# Patient Record
Sex: MALE | Race: WHITE | Employment: OTHER | URBAN - METROPOLITAN AREA
[De-identification: names, ages, dates, MRNs, and addresses within clinical notes are randomized per-mention and may not be internally consistent; named-entity substitution may affect disease eponyms.]

---

## 2022-03-19 PROBLEM — G43.009 MIGRAINE WITHOUT AURA: Status: ACTIVE | Noted: 2017-09-22

## 2022-03-19 PROBLEM — C44.111 BASAL CELL CARCINOMA (BCC) OF EYELID: Status: ACTIVE | Noted: 2018-02-19

## 2022-09-06 ENCOUNTER — NURSE ONLY (OUTPATIENT)
Dept: UROLOGY | Age: 67
End: 2022-09-06

## 2022-09-06 DIAGNOSIS — R97.20 ELEVATED PROSTATE SPECIFIC ANTIGEN (PSA): ICD-10-CM

## 2022-09-07 LAB
PSA FREE MFR SERPL: 23.7 %
PSA FREE SERPL-MCNC: 1.4 NG/ML
PSA SERPL-MCNC: 5.9 NG/ML

## 2022-10-05 PROBLEM — R97.20 ELEVATED PSA: Status: ACTIVE | Noted: 2022-10-05

## 2022-11-08 NOTE — PERIOP NOTE
Patient verified name and . Order for consent  found in EHR and matches case posting; patient verifies procedure. Type 1a surgery, PAT phone assessment complete. Orders  received. Labs per surgeon: UA s/h per surgeon . Patient agrees to come to [place] [address], [address] for pre-surgery walk- in labs and verbalizes understanding that lab is open from 8:00 am to 3:30 pm Monday through Friday. Chart flagged for charge nurse. Labs per anesthesia protocol: none    Patient answered medical/surgical history questions at their best of ability. All prior to admission medications documented in [facility]. Patient instructed to take the following medications the day of surgery according to anesthesia guidelines with a small sip of water: antibiotic as instructed by surgeon. You may substitute for Tylenol 650 mg. Hold all vitamins 7 days prior to surgery and NSAIDS 5 days prior to surgery. Prescription meds to hold:Vitamins and supplements, NSAIDS  Patient instructed on the following:    > Arrive at [facility], time of arrival to be called the day before by 1700  > NPO after midnight, unless otherwise indicated, including gum, mints, and ice chips  > Responsible adult must drive patient to the hospital, stay during surgery, and patient will need supervision 24 hours after anesthesia  > Use antibacterial soap in shower the night before surgery and on the morning of surgery  > All piercings must be removed prior to arrival.    > Leave all valuables (money and jewelry) at home but bring insurance card and ID on DOS.   > You may be required to pay a deductible or co-pay on the day of your procedure. You can pre-pay by calling [phone] if your surgery is at the [facility] or [phone] if your surgery is at the [facility]. > Do not wear make-up, nail polish, lotions, cologne, perfumes, powders, or oil on skin. Artificial nails are not permitted.

## 2022-11-09 ENCOUNTER — ANESTHESIA EVENT (OUTPATIENT)
Dept: SURGERY | Age: 67
End: 2022-11-09
Payer: MEDICARE

## 2022-11-09 ENCOUNTER — HOSPITAL ENCOUNTER (OUTPATIENT)
Dept: LAB | Age: 67
Discharge: HOME | End: 2022-11-12
Payer: MEDICARE

## 2022-11-09 LAB
APPEARANCE UR: CLEAR
BACTERIA URNS QL MICRO: NEGATIVE /HPF
BILIRUB UR QL: NEGATIVE
CASTS URNS QL MICRO: ABNORMAL /LPF
COLOR UR: ABNORMAL
EPI CELLS #/AREA URNS HPF: ABNORMAL /HPF
GLUCOSE UR STRIP.AUTO-MCNC: 250 MG/DL
HGB UR QL STRIP: ABNORMAL
KETONES UR QL STRIP.AUTO: NEGATIVE MG/DL
LEUKOCYTE ESTERASE UR QL STRIP.AUTO: NEGATIVE
NITRITE UR QL STRIP.AUTO: NEGATIVE
PH UR STRIP: 5.5 [PH] (ref 5–9)
PROT UR STRIP-MCNC: NEGATIVE MG/DL
RBC #/AREA URNS HPF: ABNORMAL /HPF
SP GR UR REFRACTOMETRY: 1.01 (ref 1–1.02)
UROBILINOGEN UR QL STRIP.AUTO: 0.2 EU/DL (ref 0.2–1)
WBC URNS QL MICRO: ABNORMAL /HPF

## 2022-11-09 PROCEDURE — 81001 URINALYSIS AUTO W/SCOPE: CPT

## 2022-11-09 NOTE — PROGRESS NOTES
Contains abnormal data Urinalysis  Order: [id number]  Status: Final result    Visible to patient: No (not released)    Next appt: 12/02/2022 at 11:30 AM in Urology (Jennifer Richardson MD)    0 Result Notes  Component Ref Range & Units 11/9/22 0940    Color, UA   YELLOW/STRAW    Comment: Color Reference Range: Straw, Yellow or Dark Yellow   Appearance   CLEAR    Specific Gravity, UA 1.001 - 1.023   1.010    pH, Urine 5.0 - 9.0   5.5    Protein, UA NEG mg/dL Negative    Glucose, UA mg/dL 250    Ketones, Urine NEG mg/dL Negative    Bilirubin Urine NEG   Negative    Blood, Urine NEG   TRACE Abnormal     Urobilinogen, Urine 0.2 - 1.0 EU/dL 0.2    Nitrite, Urine NEG   Negative    Leukocyte Esterase, Urine NEG   Negative    WBC, UA U4 /hpf 0-4    RBC, UA U5 /hpf 0-5    Epithelial Cells UA U5 /hpf 0-5    BACTERIA, URINE NEG /hpf Negative    Casts U2 /lpf 0-2    Resulting [address]              Specimen Collected: 11/09/22 09:40 EST Last Resulted: 11/09/22 10:32 EST

## 2022-11-10 ENCOUNTER — ANESTHESIA (OUTPATIENT)
Dept: SURGERY | Age: 67
End: 2022-11-10
Payer: MEDICARE

## 2022-11-10 ENCOUNTER — HOSPITAL ENCOUNTER (OUTPATIENT)
Age: 67
Setting detail: OUTPATIENT SURGERY
Discharge: HOME | End: 2022-11-10
Attending: UROLOGY | Admitting: UROLOGY
Payer: MEDICARE

## 2022-11-10 VITALS
HEART RATE: 56 BPM | RESPIRATION RATE: 16 BRPM | OXYGEN SATURATION: 100 % | TEMPERATURE: 98 F | WEIGHT: 183 LBS | DIASTOLIC BLOOD PRESSURE: 92 MMHG | BODY MASS INDEX: 24.79 KG/M2 | SYSTOLIC BLOOD PRESSURE: 128 MMHG | HEIGHT: 72 IN

## 2022-11-10 DIAGNOSIS — R97.20 ELEVATED PSA: ICD-10-CM

## 2022-11-10 LAB
APPEARANCE UR: CLEAR
BACTERIA URNS QL MICRO: NEGATIVE /HPF
BILIRUB UR QL: NEGATIVE
CASTS URNS QL MICRO: ABNORMAL /LPF
COLOR UR: ABNORMAL
EPI CELLS #/AREA URNS HPF: ABNORMAL /HPF
GLUCOSE UR STRIP.AUTO-MCNC: NEGATIVE MG/DL
HGB UR QL STRIP: ABNORMAL
KETONES UR QL STRIP.AUTO: NEGATIVE MG/DL
LEUKOCYTE ESTERASE UR QL STRIP.AUTO: NEGATIVE
NITRITE UR QL STRIP.AUTO: NEGATIVE
PH UR STRIP: 6.5 [PH] (ref 5–9)
PROT UR STRIP-MCNC: ABNORMAL MG/DL
RBC #/AREA URNS HPF: ABNORMAL /HPF
SP GR UR REFRACTOMETRY: 1.02 (ref 1–1.02)
UROBILINOGEN UR QL STRIP.AUTO: 0.2 EU/DL (ref 0.2–1)
WBC URNS QL MICRO: ABNORMAL /HPF

## 2022-11-10 PROCEDURE — 7100000011 HC PHASE II RECOVERY - ADDTL 15 MIN: Performed by: UROLOGY

## 2022-11-10 PROCEDURE — 6360000002 HC RX W HCPCS

## 2022-11-10 PROCEDURE — 3700000000 HC ANESTHESIA ATTENDED CARE: Performed by: UROLOGY

## 2022-11-10 PROCEDURE — 2580000003 HC RX 258

## 2022-11-10 PROCEDURE — 3600000012 HC SURGERY LEVEL 2 ADDTL 15MIN: Performed by: UROLOGY

## 2022-11-10 PROCEDURE — 7100000001 HC PACU RECOVERY - ADDTL 15 MIN: Performed by: UROLOGY

## 2022-11-10 PROCEDURE — 2709999900 HC NON-CHARGEABLE SUPPLY: Performed by: UROLOGY

## 2022-11-10 PROCEDURE — 3600000002 HC SURGERY LEVEL 2 BASE: Performed by: UROLOGY

## 2022-11-10 PROCEDURE — 2580000003 HC RX 258: Performed by: ANESTHESIOLOGY

## 2022-11-10 PROCEDURE — 2580000003 HC RX 258: Performed by: UROLOGY

## 2022-11-10 PROCEDURE — 3700000001 HC ADD 15 MINUTES (ANESTHESIA): Performed by: UROLOGY

## 2022-11-10 PROCEDURE — 88305 TISSUE EXAM BY PATHOLOGIST: CPT

## 2022-11-10 PROCEDURE — 55700 PR BIOPSY OF PROSTATE,NEEDLE/PUNCH: CPT | Performed by: UROLOGY

## 2022-11-10 PROCEDURE — 6360000002 HC RX W HCPCS: Performed by: UROLOGY

## 2022-11-10 PROCEDURE — 2500000003 HC RX 250 WO HCPCS

## 2022-11-10 PROCEDURE — 81001 URINALYSIS AUTO W/SCOPE: CPT

## 2022-11-10 PROCEDURE — 76942 ECHO GUIDE FOR BIOPSY: CPT | Performed by: UROLOGY

## 2022-11-10 PROCEDURE — 7100000010 HC PHASE II RECOVERY - FIRST 15 MIN: Performed by: UROLOGY

## 2022-11-10 PROCEDURE — 7100000000 HC PACU RECOVERY - FIRST 15 MIN: Performed by: UROLOGY

## 2022-11-10 RX ORDER — SODIUM CHLORIDE 0.9 % (FLUSH) 0.9 %
5-40 SYRINGE (ML) INJECTION EVERY 12 HOURS SCHEDULED
Status: DISCONTINUED | OUTPATIENT
Start: 2022-11-10 | End: 2022-11-10 | Stop reason: HOSPADM

## 2022-11-10 RX ORDER — SODIUM CHLORIDE 0.9 % (FLUSH) 0.9 %
5-40 SYRINGE (ML) INJECTION PRN
Status: DISCONTINUED | OUTPATIENT
Start: 2022-11-10 | End: 2022-11-10 | Stop reason: HOSPADM

## 2022-11-10 RX ORDER — HYDROMORPHONE HYDROCHLORIDE 2 MG/ML
0.25 INJECTION, SOLUTION INTRAMUSCULAR; INTRAVENOUS; SUBCUTANEOUS EVERY 5 MIN PRN
Status: DISCONTINUED | OUTPATIENT
Start: 2022-11-10 | End: 2022-11-10 | Stop reason: HOSPADM

## 2022-11-10 RX ORDER — SODIUM CHLORIDE 9 MG/ML
INJECTION, SOLUTION INTRAVENOUS PRN
Status: DISCONTINUED | OUTPATIENT
Start: 2022-11-10 | End: 2022-11-10 | Stop reason: HOSPADM

## 2022-11-10 RX ORDER — SODIUM CHLORIDE, SODIUM LACTATE, POTASSIUM CHLORIDE, CALCIUM CHLORIDE 600; 310; 30; 20 MG/100ML; MG/100ML; MG/100ML; MG/100ML
INJECTION, SOLUTION INTRAVENOUS CONTINUOUS
Status: DISCONTINUED | OUTPATIENT
Start: 2022-11-10 | End: 2022-11-10 | Stop reason: HOSPADM

## 2022-11-10 RX ORDER — PROPOFOL 10 MG/ML
INJECTION, EMULSION INTRAVENOUS PRN
Status: DISCONTINUED | OUTPATIENT
Start: 2022-11-10 | End: 2022-11-10 | Stop reason: SDUPTHER

## 2022-11-10 RX ORDER — LIDOCAINE HYDROCHLORIDE 10 MG/ML
1 INJECTION, SOLUTION INFILTRATION; PERINEURAL
Status: DISCONTINUED | OUTPATIENT
Start: 2022-11-10 | End: 2022-11-10 | Stop reason: HOSPADM

## 2022-11-10 RX ORDER — LIDOCAINE HYDROCHLORIDE 20 MG/ML
INJECTION, SOLUTION EPIDURAL; INFILTRATION; INTRACAUDAL; PERINEURAL PRN
Status: DISCONTINUED | OUTPATIENT
Start: 2022-11-10 | End: 2022-11-10 | Stop reason: SDUPTHER

## 2022-11-10 RX ORDER — HALOPERIDOL 5 MG/ML
1 INJECTION INTRAMUSCULAR
Status: DISCONTINUED | OUTPATIENT
Start: 2022-11-10 | End: 2022-11-10 | Stop reason: HOSPADM

## 2022-11-10 RX ORDER — ONDANSETRON 2 MG/ML
4 INJECTION INTRAMUSCULAR; INTRAVENOUS
Status: DISCONTINUED | OUTPATIENT
Start: 2022-11-10 | End: 2022-11-10 | Stop reason: HOSPADM

## 2022-11-10 RX ORDER — OXYCODONE HYDROCHLORIDE 5 MG/1
5 TABLET ORAL
Status: DISCONTINUED | OUTPATIENT
Start: 2022-11-10 | End: 2022-11-10 | Stop reason: HOSPADM

## 2022-11-10 RX ORDER — DEXTROSE MONOHYDRATE 100 MG/ML
INJECTION, SOLUTION INTRAVENOUS CONTINUOUS PRN
Status: DISCONTINUED | OUTPATIENT
Start: 2022-11-10 | End: 2022-11-10 | Stop reason: HOSPADM

## 2022-11-10 RX ADMIN — SODIUM CHLORIDE, SODIUM LACTATE, POTASSIUM CHLORIDE, AND CALCIUM CHLORIDE: 600; 310; 30; 20 INJECTION, SOLUTION INTRAVENOUS at 08:22

## 2022-11-10 RX ADMIN — LIDOCAINE HYDROCHLORIDE 40 MG: 20 INJECTION, SOLUTION EPIDURAL; INFILTRATION; INTRACAUDAL; PERINEURAL at 10:20

## 2022-11-10 RX ADMIN — PHENYLEPHRINE HYDROCHLORIDE 100 MCG: 10 INJECTION INTRAVENOUS at 10:40

## 2022-11-10 RX ADMIN — CEFTRIAXONE 1000 MG: 1 INJECTION, POWDER, FOR SOLUTION INTRAMUSCULAR; INTRAVENOUS at 10:22

## 2022-11-10 RX ADMIN — PROPOFOL 50 MG: 10 INJECTION, EMULSION INTRAVENOUS at 10:20

## 2022-11-10 RX ADMIN — PROPOFOL 160 MCG/KG/MIN: 10 INJECTION, EMULSION INTRAVENOUS at 10:21

## 2022-11-10 ASSESSMENT — PAIN - FUNCTIONAL ASSESSMENT: PAIN_FUNCTIONAL_ASSESSMENT: 0-10

## 2022-11-10 NOTE — OP NOTE
Operative Note      Patient: Ministerio Durand  YOB: 1955  MRN: [id number]    Date of Procedure: 11/10/2022    Pre-Op Diagnosis: Elevated PSA [R97.20]    Post-Op Diagnosis: Same       Procedure(s):  PROSTATE BIOPSY/MRI FUSION    Surgeon(s):  Yuriy Link MD    Assistant:   * No surgical staff found *    Anesthesia: Monitor Anesthesia Care    Estimated Blood Loss (mL): Minimal    Complications: None    Specimens:   ID Type Source Tests Collected by Time Destination   A : LLB Tissue Prostate SURGICAL PATHOLOGY Yuriy Link, MD 11/10/2022 9357    B : LLM Tissue Prostate SURGICAL PATHOLOGY Yuriy Link, MD 11/10/2022 4882    C : LLA Tissue Prostate SURGICAL PATHOLOGY Yuriy Link, MD 11/10/2022 4503    D : LB Tissue Prostate SURGICAL PATHOLOGY Yuriy Link, MD 11/10/2022 4657    E : LM Tissue Prostate SURGICAL PATHOLOGY Yuriy Link MD 11/10/2022 1771    F : LA Tissue Prostate SURGICAL PATHOLOGY Yuriy Link, MD 11/10/2022 3521    G : RB Tissue Prostate SURGICAL PATHOLOGY Yuriy Link MD 11/10/2022 8982    H : RM Tissue Prostate SURGICAL PATHOLOGY Yuriy Link, MD 11/10/2022 0806    I : RA Tissue Prostate SURGICAL PATHOLOGY Yuriy Link MD 11/10/2022 7019    J : RLB Tissue Prostate SURGICAL PATHOLOGY Yuriy Link MD 11/10/2022 7092    K Belkis Dyer Tissue Prostate SURGICAL PATHOLOGY Yuriy Link MD 11/10/2022 3149    L : RLA Tissue Prostate SURGICAL PATHOLOGY Yuriy Link MD 11/10/2022 7154    M : ELIZABETH 1 RA Tissue Prostate SURGICAL PATHOLOGY Yuriy Link MD 11/10/2022 1549    N : ELIZABETH 2 RIGHT ANTERIOR Tissue Prostate SURGICAL PATHOLOGY Yuriy Link MD 11/10/2022 1045        Implants:  * No implants in log *      Drains: * No LDAs found *    Findings: see op note    Detailed Description of Procedure:   Operative Note                Patient: Ministerio Durand, [id number]    Date of Surgery: 11/10/22    Preoperative Diagnosis: Elevated psa and prostate lesion(s) on MRI imaging    Postoperative Diagnosis:  same    Surgeon(s) and Role:     * Haroldo Gomes MD - Primary     Anesthesia:  IV sedation     Procedure: Procedure(s):  URONAV MRI fused TRUS prostate biopsy     RISKS DISCUSSION:     Discussed the risk of surgery including infection, hematoma, bleeding, and the risks of general anesthetic. The patient understands the risks, any and all questions were answered to the patient's satisfaction and they freely signed the consent for operation. URONAV MRI FUSED TRANSRECTAL ULTRASOUND GUIDED BIOPSY OF THE PROSTATE    All risks, benefits and alternatives were again reviewed and he is willing to proceed at this time. The patient was placed in the left lateral decubitus position. I then inserted the transrectal ultrasound probe into the rectum. The prostate was visualized. The prostate appeared homogenous in appearance. Ultrasonographic sweep of the prostate was performed to obtain images to link to MRI via URONAV. There were 2 regions of interest based upon MRI imaging (right apex, right anterior). 4,3 biopsies of regions of interest were then obtained using the ultrasound images for guidance that had been linked to the previous MRI using Uronav. I then performed 12 needle core biopsies using a standard sextant biopsy format with traditional ultrasound images for guidance. The ultrasound probe was removed. The patient tolerated the procedure well.       PROSTATE VOLUME:  55 gm  PSA 5.9 PSAD 0.11    Estimated Blood Loss:  minimal    Specimens: prostate biopsies             Drains: none                 Implants: * No implants in log *           Signed By: Haroldo Gomes MD                Electronically signed by Jose Armando Lopez MD on 11/10/2022 at 10:48 AM

## 2022-11-10 NOTE — ANESTHESIA PRE PROCEDURE
Department of Anesthesiology  Preprocedure Note       Name:  Gal Lundy   Age:  79 y.o.  :  1955                                          MRN:  [id number]         Date:  11/10/2022      Surgeon: Janeen Armenta):  Armando Darby MD    Procedure: Procedure(s):  PROSTATE BIOPSY/MRI FUSION    Medications prior to admission:   Prior to Admission medications    Medication Sig Start Date End Date Taking? Authorizing Provider   Cholecalciferol 50 MCG (2000) TABS Take by mouth daily    Ar Automatic Reconciliation   pravastatin (PRAVACHOL) 20 MG tablet TAKE 1 TABLET BY MOUTH EVERY NIGHT 22   Ar Automatic Reconciliation       Current medications:    Current Facility-Administered Medications   Medication Dose Route Frequency Provider Last Rate Last Admin    lidocaine 1 % injection 1 mL  1 mL IntraDERmal Once PRN Kalen Agustin MD        lactated ringers infusion   IntraVENous Continuous Kalen Agustin  mL/hr at 11/10/22 0822 New Bag at 11/10/22 8942    sodium chloride flush 0.9 % injection 5-40 mL  5-40 mL IntraVENous 2 times per day Kalen Agustin MD        sodium chloride flush 0.9 % injection 5-40 mL  5-40 mL IntraVENous PRN Kalen Agustin MD        0.9 % sodium chloride infusion   IntraVENous PRN Kalen Agustin MD        cefTRIAXone (ROCEPHIN) 1,000 mg in sodium chloride 0.9 % 50 mL IVPB mini-bag  1,000 mg IntraVENous On Call to [facility] MD   Held at 11/10/22 3190       Allergies:  No Known Allergies    Problem List:    Patient Active Problem List   Diagnosis Code    Vertigo R42    Vitamin D deficiency E55.9    Joint pain M25.50    Hypercholesteremia E78.00    Basal cell carcinoma (BCC) of eyelid C44. 111    Migraine without aura G43.009    Microscopic hematuria R31.29    Diverticula of colon K57.30    Elevated PSA R97.20       Past Medical History:        Diagnosis Date    Chronic pain     back pain    Diverticula of colon     Herpes zoster     History of local excision of skin lesion     from face    Hypercholesteremia 11/13/2013    Joint pain     Microscopic hematuria 11/13/2013    Vertigo     Vitamin D deficiency        Past Surgical History:        Procedure Laterality Date    COLONOSCOPY N/A 2/12/2018    COLONOSCOPY/ 24 performed [facility]COPY    COLONOSCOPY FLX DX W/COLLJ SPEC WHEN PFRMD  02/12/2018    Dr. Celina Goldstein in 10 years.  COLONOSCOPY W/BIOPSY SINGLE/MULTIPLE  6/7/2010    Dr. Aquilino Osullivan in 7 years.  SKIN LESION EXCISION      face       Social History:    Social History     Tobacco Use    Smoking status: Never    Smokeless tobacco: Never   Substance Use Topics    Alcohol use: Not Currently                                Counseling given: Not Answered      Vital Signs (Current):   Vitals:    11/08/22 0954 11/10/22 0832   BP:  139/80   Pulse:  55   Resp:  18   Temp:  98.1 °F (36.7 °C)   TempSrc:  Oral   SpO2:  100%   Weight: 178 lb (80.7 kg) 183 lb (83 kg)   Height: 6' (1.829 m) 6' (1.829 m)                                              BP Readings from Last 3 Encounters:   11/10/22 139/80   01/12/22 120/84   03/12/21 120/80       NPO Status: Time of last liquid consumption: 0000                        Time of last solid consumption: 0000                        Date of last liquid consumption: 11/09/22                        Date of last solid food consumption: 11/09/22    BMI:   Wt Readings from Last 3 Encounters:   11/10/22 183 lb (83 kg)   09/24/22 178 lb (80.7 kg)   01/12/22 181 lb (82.1 kg)     Body mass index is 24.82 kg/m².     CBC:   Lab Results   Component Value Date/Time    MCV 97.0 01/05/2022 09:50 AM       CMP:   Lab Results   Component Value Date/Time     01/05/2022 09:46 AM    K 4.1 01/05/2022 09:46 AM     01/05/2022 09:46 AM    CO2 24 01/05/2022 09:46 AM    BUN 21 01/05/2022 09:46 AM    CREATININE 1.24 01/05/2022 09:46 AM    GFRAA 70 01/05/2022 09:46 AM    AGRATIO 1.9 01/05/2022 09:46 AM    GLUCOSE 117 01/05/2022 09:46 AM    PROT 7.5 01/05/2022 09:46 AM    CALCIUM 9.4 01/05/2022 09:46 AM    BILITOT 0.8 01/05/2022 09:46 AM    ALKPHOS 60 01/05/2022 09:46 AM    AST 20 01/05/2022 09:46 AM    ALT 19 01/05/2022 09:46 AM       POC Tests: No results for input(s): POCGLU, POCNA, POCK, POCCL, POCBUN, POCHEMO, POCHCT in the last 72 hours. Coags: No results found for: PROTIME, INR, APTT    HCG (If Applicable): No results found for: PREGTESTUR, PREGSERUM, HCG, HCGQUANT     ABGs: No results found for: PHART, PO2ART, RUM4RVU, ECK7DSD, BEART, [id number]     Type & Screen (If Applicable):  No results found for: LABABO, LABRH    Drug/Infectious Status (If Applicable):  No results found for: HIV, HEPCAB    COVID-19 Screening (If Applicable): No results found for: COVID19        Anesthesia Evaluation  Patient summary reviewed and Nursing notes reviewed no history of anesthetic complications:   Airway: Mallampati: II  TM distance: >3 FB   Neck ROM: full  Mouth opening: > = 3 FB   Dental: normal exam         Pulmonary:Negative Pulmonary ROS and normal exam                               Cardiovascular:    (+) valvular problems/murmurs (moderate MR and TR): MR, hyperlipidemia                  Neuro/Psych:   (+) headaches: migraine headaches,              ROS comment: Vertigo GI/Hepatic/Renal:             Endo/Other:                     Abdominal:             Vascular: Other Findings:           Anesthesia Plan      TIVA     ASA 2       Induction: intravenous. MIPS: Postoperative opioids intended. Anesthetic plan and risks discussed with patient.                         Dave Agarwal MD   11/10/2022

## 2022-11-10 NOTE — ANESTHESIA POSTPROCEDURE EVALUATION
Department of Anesthesiology  Postprocedure Note    Patient: Reyes Jasper  MRN: [id number]  YOB: 1955  Date of evaluation: 11/10/2022      Procedure Summary     Date: 11/10/22 Room / Location: [facility] MAIN OR  / [facility] MAIN OR    Anesthesia Start: 1011 Anesthesia Stop: 1054    Procedure: PROSTATE BIOPSY/MRI FUSION (Buttocks) Diagnosis:       Elevated PSA      (Elevated PSA [R97.20])    Providers: Emmy Soares MD Responsible Provider: Latasha Bianchi MD    Anesthesia Type: TIVA ASA Status: 2          Anesthesia Type: No value filed. Dontrell Phase I: Dontrell Score: 5    Dontrell Phase II: Dontrell Score: 10      Anesthesia Post Evaluation    Patient location during evaluation: PACU  Patient participation: complete - patient participated  Level of consciousness: awake and alert  Airway patency: patent  Nausea: well controlled. Complications: no  Cardiovascular status: acceptable.   Respiratory status: acceptable  Hydration status: stable

## 2022-11-10 NOTE — H&P
Maia Ceballos  : 1955         Chief Complaint   Patient presents with    Follow-up         HPI      Maia Ceballos is a 79 y.o. male   With elevated PSA. PSA 4.9 in , 4.8 in . PSA was 3.4 in , 4.1 in , 3.1 in , 3.8 in . He denies any significant LUTS. No family hx of prostate cancer. He stays active at the [place] [address] showed 2+ prostate without nodules. In , total PSA 5.9 with 23.7% free PSA. Past Medical History:   Diagnosis Date    Chronic pain       back pain    Diverticula of colon      Herpes zoster      Hypercholesteremia 2013    Joint pain      Microscopic hematuria 2013    Vertigo      Vitamin D deficiency              Past Surgical History:   Procedure Laterality Date    COLONOSCOPY N/A 2018     COLONOSCOPY/ 24 performed by Mustapha Hannah MD at [facility] ENDOSCOPY    COLONOSCOPY FLX DX W/COLLJ [facility] WHEN PFRMD   2018     Dr. Abel Pena in 10 years. COLONOSCOPY W/BIOPSY SINGLE/MULTIPLE   2010     Dr. Vicci Ormond in 7 years. Current Outpatient Medications   Medication Sig Dispense Refill    Cholecalciferol 50 MCG ( UT) TABS Take by mouth daily        pravastatin (PRAVACHOL) 20 MG tablet TAKE 1 TABLET BY MOUTH EVERY NIGHT          No current facility-administered medications for this visit. No Known Allergies  Social History            Socioeconomic History    Marital status: Single       Spouse name: Not on file    Number of children: Not on file    Years of education: Not on file    Highest education level: Not on file   Occupational History    Not on file   Tobacco Use    Smoking status: Never    Smokeless tobacco: Never   Substance and Sexual Activity    Alcohol use:  Yes       Alcohol/week: 0.0 standard drinks    Drug use: No    Sexual activity: Not on file   Other Topics Concern    Not on file   Social History Narrative    Not on file      Social Determinants of Health      Financial Resource Strain: Not on file   Food Insecurity: Not on file   Transportation Needs: Not on file   Physical Activity: Not on file   Stress: Not on file   Social Connections: Not on file   Intimate Partner Violence: Not on file   Housing Stability: Not on file            Family History   Problem Relation Age of Onset    Cancer Maternal Aunt      Diabetes Mother      Hypertension Mother      Asthma Mother      Heart Disease Father           Review of Systems  Constitutional:   Negative for fever. Cardiovascular:  Negative for chest pain. GI:  Negative for nausea. Genitourinary:  Negative for urinary burning. Musculoskeletal:  Negative for back pain. Physical Exam  General   Mental Status - Patient is alert and oriented X3. Build & Nutrition - Well nourished. Chest and Lung Exam   Chest and lung exam reveals  - normal excursion with symmetric chest walls, quiet, even and easy respiratory effort with no use of accessory muscles and on auscultation, normal breath sounds, no adventitious sounds and normal vocal resonance. Cardiovascular   Cardiovascular examination reveals  - normal heart sounds, regular rate and rhythm with no murmurs. Abdomen   Palpation/Percussion: Palpation and Percussion of the abdomen reveal - Non Tender, No Rebound tenderness, No Rigidity (guarding), No hepatosplenomegaly, No Palpable abdominal masses and Soft. Hernia - Bilateral - No Hernia(s) present. Assessment and Plan      ICD-10-CM     1.  Elevated prostate specific antigen (PSA)  R97.20 AMB POC URINALYSIS DIP STICK AUTO W/O MICRO       MRI PELVIS W WO CONTRAST                   Orders Placed This Encounter   Procedures    MRI PELVIS W WO CONTRAST       Standing Status:   Future       Standing Expiration Date:   9/12/2023       Order Specific Question:   STAT Creatinine as needed:       Answer:   No       Order Specific Question:   Reason for exam:       Answer:   elevated PSA    AMB POC URINALYSIS DIP STICK AUTO W/O MICRO   Rising PSA. Will get MRI. May need MRI fusion biopsy. MRI 8-24-22:   FINDINGS:   Prostate gland size: 4.9 cm transverse x 4.2 cm AP x 4.6 cm craniocaudal.           Peripheral zone:   Lesion 1: There is 0.8 cm lesion with focal homogeneous hypointensity on the ADC   map (series 550 image 9), located on the posterior right peripheral zone at 7   o'clock 1 cm from the midline. There is associated hyperintensity on the high b   value diffusion-weighted sequence. There is associated focal hypointensity on   the T2 sequences. Analysis of the dynamic pre- and postgadolinium kinetics   demonstrates findings positive for focal, early enhancement. PI-RADS   classification: 4 (high probability for the presence of clinically significant   cancer). Transition zone: Signal changes consistent with benign prostatic hypertrophy are   noted. Lesion 2: There is a 0.7 cm focal homogeneously T2 hypointense lesion (series 3   image 15), located on the right para midline gland near the apex at 9 o'clock   less than 1 cm from the midline. There is associated focal hypointensity on the   ADC map, and associated hyperintensity on the high b value diffusion-weighted   sequence. Analysis of the dynamic pre- and postgadolinium kinetics demonstrates   findings positive for focal, early enhancement. PI-RADS classification: 4 (high   probability for the presence of clinically significant cancer). The prostatic capsule appears intact. The rectoprostatic angle and neurovascular   bundles are unremarkable. The seminal vesicles are symmetric and unremarkable. The urinary bladder is unremarkable. Remainder of the pelvis: No enlarged lymph nodes are identified in the pelvis. No aggressive appearing lesions in the visualized bony pelvis. Impression   1.  Lesion 1 (PI-RADS 4): A 0.8 cm lesion located at the posterior right   peripheral zone, 7 o'clock, has a PI-RADS classification 4, high probability for   a clinically significant cancer). 2. Lesion 2 (PI-RADS 4): A 0.7 cm lesion located at the right para midline gland   near the apex, 9 o'clock, has a PI-RADS classification 4, high probability for a   clinically significant cancer). 3. There are changes of benign prostatic hypertrophy. Plan MRI fusion prostate biopsy .

## 2022-12-02 ENCOUNTER — OFFICE VISIT (OUTPATIENT)
Dept: UROLOGY | Age: 67
End: 2022-12-02
Payer: MEDICARE

## 2022-12-02 DIAGNOSIS — C61 PROSTATE CANCER (HCC): Primary | ICD-10-CM

## 2022-12-02 PROCEDURE — 99214 OFFICE O/P EST MOD 30 MIN: CPT | Performed by: UROLOGY

## 2022-12-02 PROCEDURE — 1123F ACP DISCUSS/DSCN MKR DOCD: CPT | Performed by: UROLOGY

## 2022-12-02 ASSESSMENT — ENCOUNTER SYMPTOMS
RESPIRATORY NEGATIVE: 1
EYES NEGATIVE: 1

## 2022-12-02 NOTE — PROGRESS NOTES
[facility] Urology  [address]  [address] [facility], [address] Joseline Evans  : 1955    Chief Complaint   Patient presents with    Follow-up     Discuss path report         HPI     Magda Atkins is a 79 y.o. male    With elevated PSA. PSA 4.9 in 1-22, 4.8 in 2-22. PSA was 3.4 in 11-18, 4.1 in 12-19, 3.1 in 1-20, 3.8 in 12-20. He denies any significant LUTS. No family hx of prostate cancer. He stays active at the [facility] 94 showed 2+ prostate without nodules. In , total PSA 5.9 with 23.7% free PSA. MRI 22:   FINDINGS:   Prostate gland size: 4.9 cm transverse x 4.2 cm AP x 4.6 cm craniocaudal.           Peripheral zone:   Lesion 1: There is 0.8 cm lesion with focal homogeneous hypointensity on the ADC   map (series 550 image 9), located on the posterior right peripheral zone at 7   o'clock 1 cm from the midline. There is associated hyperintensity on the high b   value diffusion-weighted sequence. There is associated focal hypointensity on   the T2 sequences. Analysis of the dynamic pre- and postgadolinium kinetics   demonstrates findings positive for focal, early enhancement. PI-RADS   classification: 4 (high probability for the presence of clinically significant   cancer). Transition zone: Signal changes consistent with benign prostatic hypertrophy are   noted. Lesion 2: There is a 0.7 cm focal homogeneously T2 hypointense lesion (series 3   image 15), located on the right para midline gland near the apex at 9 o'clock   less than 1 cm from the midline. There is associated focal hypointensity on the   ADC map, and associated hyperintensity on the high b value diffusion-weighted   sequence. Analysis of the dynamic pre- and postgadolinium kinetics demonstrates   findings positive for focal, early enhancement. PI-RADS classification: 4 (high   probability for the presence of clinically significant cancer).            The prostatic capsule appears intact. The rectoprostatic angle and neurovascular   bundles are unremarkable. The seminal vesicles are symmetric and unremarkable. The urinary bladder is unremarkable. Remainder of the pelvis: No enlarged lymph nodes are identified in the pelvis. No aggressive appearing lesions in the visualized bony pelvis. Impression   1. Lesion 1 (PI-RADS 4): A 0.8 cm lesion located at the posterior right   peripheral zone, 7 o'clock, has a PI-RADS classification 4, high probability for   a clinically significant cancer). 2. Lesion 2 (PI-RADS 4): A 0.7 cm lesion located at the right para midline gland   near the apex, 9 o'clock, has a PI-RADS classification 4, high probability for a   clinically significant cancer). 3. There are changes of benign prostatic hypertrophy. MRI fusion prostate biopsy 11-10-22: PROSTATE VOLUME:  55 gm  PSA 5.9 PSAD 0.11  Path\": DIAGNOSIS        A:  \"PROSTATE, LEFT LATERAL BASE, BIOPSY\":               BENIGN PROSTATE TISSUE. B:  \"PROSTATE, LEFT LATERAL MID, BIOPSY\":               PROSTATIC ADENOCARCINOMA, DWAYNE SCORE 3 + 3 = 6 (GRADE GROUP   1),                  DISCONTINUOUSLY INVOLVING 30% OF 1 CORE.          C:  \"PROSTATE, LEFT LATERAL APEX, BIOPSY\":               BENIGN PROSTATE TISSUE. D:  \"PROSTATE, LEFT BASE, BIOPSY\":               BENIGN PROSTATE TISSUE. E:  \"PROSTATE, LEFT MID, BIOPSY\":               BENIGN PROSTATE TISSUE. F:  \"PROSTATE, LEFT APEX, BIOPSY\":               BENIGN PROSTATE TISSUE. G:  \"PROSTATE, RIGHT BASE, BIOPSY\":               BENIGN PROSTATE TISSUE. H:  \"PROSTATE, RIGHT MID, BIOPSY\":               BENIGN PROSTATE TISSUE. I:  \"PROSTATE, RIGHT APEX, BIOPSY\":               PROSTATE TISSUE WITH FOCUS OF ATYPICAL GLANDS SUSPICIOUS FOR                  ADENOCARCINOMA. J:  \"PROSTATE, RIGHT LATERAL BASE, BIOPSY\":               BENIGN PROSTATE TISSUE. K: \"PROSTATE, RIGHT LATERAL MID, BIOPSY\":               PROSTATIC ADENOCARCINOMA, DWAYNE SCORE 3 + 4 = 7 (GRADE GROUP   2;                  PERCENT PATTERN 4: 20%), DISCONTINUOUSLY INVOLVING 40% OF                  1 CORE.          L:  \"PROSTATE, RIGHT LATERAL APEX, BIOPSY\":             PROSTATIC ADENOCARCINOMA, DWAYNE SCORE 3 + 4 = 7 (GRADE GROUP   2;                  PERCENT PATTERN 4: 20%), DISCONTINUOUSLY INVOLVING 20% OF                  1 CORE.          M:  \"PROSTATE, REGION OF INTEREST 1 RIGHT APEX, BIOPSY\":             BENIGN PROSTATE TISSUE. N:  \"PROSTATE, REGION OF INTEREST 2 RIGHT ANTERIOR, BIOPSY\":               BENIGN PROSTATE TISSUE. Past Medical History:   Diagnosis Date    Chronic pain     back pain    Diverticula of colon     Herpes zoster     History of local excision of skin lesion     from face    Hypercholesteremia 11/13/2013    Joint pain     Microscopic hematuria 11/13/2013    Vertigo     Vitamin D deficiency      Past Surgical History:   Procedure Laterality Date    COLONOSCOPY N/A 2/12/2018    COLONOSCOPY/ 24 performed by Devin Dumont MD at [facility] ENDOSCOPY    COLONOSCOPY FLX DX W/[facility] WHEN PFRMD  02/12/2018    Dr. Rossy Young in 10 years. COLONOSCOPY W/BIOPSY SINGLE/MULTIPLE  6/7/2010    Dr. Argelia Villarreal in 7 years. PROSTATE BIOPSY N/A 11/10/2022    PROSTATE BIOPSY/MRI FUSION performed by Parris Jolly MD at [facility] MAIN OR    SKIN LESION EXCISION      face     Current Outpatient Medications   Medication Sig Dispense Refill    Cholecalciferol 50 MCG (2000 UT) TABS Take by mouth daily      pravastatin (PRAVACHOL) 20 MG tablet TAKE 1 TABLET BY MOUTH EVERY NIGHT       No current facility-administered medications for this visit.      No Known Allergies  Social History     Socioeconomic History    Marital status: Single     Spouse name: Not on file    Number of children: Not on file    Years of education: Not on file    Highest education level: Not on file   Occupational History    Not on file   Tobacco Use    Smoking status: Never    Smokeless tobacco: Never   Substance and Sexual Activity    Alcohol use: Not Currently    Drug use: No    Sexual activity: Not on file   Other Topics Concern    Not on file   Social History Narrative    Not on file     Social Determinants of Health     Financial Resource Strain: Not on file   Food Insecurity: Not on file   Transportation Needs: Not on file   Physical Activity: Not on file   Stress: Not on file   Social Connections: Not on file   Intimate Partner Violence: Not on file   Housing Stability: Not on file     Family History   Problem Relation Age of Onset    Cancer Maternal Aunt     Diabetes Mother     Hypertension Mother     Asthma Mother     Heart Disease Father        Review of Systems  Constitutional: Negative  Skin: Negative skin ROS  Eyes: Eyes negative  ENT: HENT negative  Respiratory: Respiratory negative  Cardiovascular: Neg cardio ROS  GI: Neg GI ROS  Genitourinary: Positive for hematuria. Musculoskeletal: Musculoskeletal negative  Neurological: Positive for dizziness. Psychological: Neg psych ROS  Endocrine: Endocrine negative  Hem/Lymphatic: Hematologic/lymphatic negative    There were no vitals taken for this visit.     Urinalysis  UA - Dipstick  Results for orders placed or performed in visit on 09/12/22   AMB POC URINALYSIS DIP STICK AUTO W/O MICRO   Result Value Ref Range    Color (UA POC)      Clarity (UA POC)      Glucose, Urine,  Negative    Bilirubin, Urine, POC Negative Negative    KETONES, Urine, POC Negative Negative    Specific Gravity, Urine, POC 1.025 1.001 - 1.035    Blood (UA POC) Small Negative    pH, Urine, POC 5.5 4.6 - 8.0    Protein, Urine, POC Trace Negative    Urobilinogen, POC Normal     Nitrite, Urine, POC Negative Negative    Leukocyte Esterase, Urine, POC Negative Negative       UA - Micro  WBC - 0  RBC - 0  Bacteria - 0  Epith - 0    Physical Exam    Assessment and Plan ICD-10-CM    1. Prostate cancer ([facility] [address]  [address] [place] [address] Oncology     PSA, Diagnostic      Stage T1c, grade 3+4=7 prostate cancer. .  Pathology results from the biopsy and all available treatment options were reviewed in detail with the patient today. Treatments options discussed but not limited to included surgery (open, perineal and robotic assisted laparoscopic approaches), external beam radiation, brachytherapy, proton beam therapy, cryosurgery, HIFU, androgen deprivation therapy and watchful waiting including active surveillance. I discussed all risks, possible side effects and benefits associated with the above treatment options. I specifically discussed the 30-40% risk of permanent ED in men without preoperative ED, 100% risk of ED in men with preoperative ED, 10-15% risk of permanent incontinence requiring pads, and 2-3% risk of severe incontinence in patients undergoing open or robotic radical prostatectomy. He is not sexually active, doesn't have a partner. Minimal LUTS. Options are AS, prostatectomy, radiation therapy, ablation. He is more inclined to consider radiation therapy. Orders Placed This Encounter   Procedures    PSA, Diagnostic     Standing Status:   Future     Standing Expiration Date:   12/2/2023    [address] [facility] [place] Radiation Oncology     Referral Priority:   Routine     Referral Type:   Eval and Treat     Referral Reason:   Specialty Services Required     Requested Specialty:   Radiation Oncology     Number of Visits Requested:   1     Will refer to rad onc, short term surveillance for now. Follow-up and Dispositions    Return in about 3 months (around 3/2/2023) for appt, PSA before. Discussion lasted 35 minutes.

## 2022-12-20 ENCOUNTER — HOSPITAL ENCOUNTER (OUTPATIENT)
Dept: RADIATION ONCOLOGY | Age: 67
Setting detail: RECURRING SERIES
Discharge: HOME | End: 2022-12-23
Payer: MEDICARE

## 2022-12-20 VITALS
TEMPERATURE: 97.6 F | HEART RATE: 57 BPM | WEIGHT: 181.2 LBS | BODY MASS INDEX: 24.58 KG/M2 | DIASTOLIC BLOOD PRESSURE: 84 MMHG | OXYGEN SATURATION: 100 % | SYSTOLIC BLOOD PRESSURE: 148 MMHG

## 2022-12-20 PROCEDURE — 99211 OFF/OP EST MAY X REQ PHY/QHP: CPT

## 2022-12-20 NOTE — CONSULTS
Patient: Ned Singleton MRN: [id number]  SSN: [id number]    YOB: 1955  Age: 79 y.o. Sex: male      Other Providers:  Dr Denney Cos: rising psa     DIAGNOSIS: prostate cancer, favorable intermediate risk, pre-treatment psa 5.9 ng/ml    PREVIOUS RADIATION TREATMENT:  None     HISTORY OF PRESENT ILLNESS:  Ned Singleton is a 79 y.o. male who I am seeing at the request of Dr Parish De Dios. Ned Singleton is a 79 y.o. male    With elevated PSA. PSA 4.9 in 1-22, 4.8 in 2-22. PSA was 3.4 in 11-18, 4.1 in 12-19, 3.1 in 1-20, 3.8 in 12-20. He denies any significant LUTS. No family hx of prostate cancer. He stays active at the [facility] 94 showed 2+ prostate without nodules. In 9-22, total PSA 5.9 with 23.7% free PSA. MRI 8-24-22:   FINDINGS:   Prostate gland size: 4.9 cm transverse x 4.2 cm AP x 4.6 cm craniocaudal.           Peripheral zone:   Lesion 1: There is 0.8 cm lesion with focal homogeneous hypointensity on the ADC   map (series 550 image 9), located on the posterior right peripheral zone at 7   o'clock 1 cm from the midline. There is associated hyperintensity on the high b   value diffusion-weighted sequence. There is associated focal hypointensity on   the T2 sequences. Analysis of the dynamic pre- and postgadolinium kinetics   demonstrates findings positive for focal, early enhancement. PI-RADS   classification: 4 (high probability for the presence of clinically significant   cancer). Transition zone: Signal changes consistent with benign prostatic hypertrophy are     noted. Lesion 2: There is a 0.7 cm focal homogeneously T2 hypointense lesion (series 3   image 15), located on the right para midline gland near the apex at 9 o'clock   less than 1 cm from the midline. There is associated focal hypointensity on the   ADC map, and associated hyperintensity on the high b value diffusion-weighted   sequence.  Analysis of the dynamic pre- and postgadolinium kinetics demonstrates   findings positive for focal, early enhancement. PI-RADS classification: 4 (high   probability for the presence of clinically significant cancer). The prostatic capsule appears intact. The rectoprostatic angle and neurovascular   bundles are unremarkable. The seminal vesicles are symmetric and unremarkable. The urinary bladder is unremarkable. Fusion biopsy revealed :  Name:    Yazmin Johns   Accession Number:   [id number]   MR #   [id number]   Date Obtained:   11/10/2022   DIAGNOSIS        A:  \"PROSTATE, LEFT LATERAL BASE, BIOPSY\":               BENIGN PROSTATE TISSUE. B:  \"PROSTATE, LEFT LATERAL MID, BIOPSY\":               PROSTATIC ADENOCARCINOMA, DWAYNE SCORE 3 + 3 = 6 (GRADE GROUP   1),                  DISCONTINUOUSLY INVOLVING 30% OF 1 CORE.          C:  \"PROSTATE, LEFT LATERAL APEX, BIOPSY\":               BENIGN PROSTATE TISSUE. D:  \"PROSTATE, LEFT BASE, BIOPSY\":               BENIGN PROSTATE TISSUE. E:  \"PROSTATE, LEFT MID, BIOPSY\":               BENIGN PROSTATE TISSUE. F:  \"PROSTATE, LEFT APEX, BIOPSY\":               BENIGN PROSTATE TISSUE. G:  \"PROSTATE, RIGHT BASE, BIOPSY\":               BENIGN PROSTATE TISSUE. H:  \"PROSTATE, RIGHT MID, BIOPSY\":               BENIGN PROSTATE TISSUE. I:  \"PROSTATE, RIGHT APEX, BIOPSY\":               PROSTATE TISSUE WITH FOCUS OF ATYPICAL GLANDS SUSPICIOUS FOR                  ADENOCARCINOMA. J:  \"PROSTATE, RIGHT LATERAL BASE, BIOPSY\":               BENIGN PROSTATE TISSUE.           K:  \"PROSTATE, RIGHT LATERAL MID, BIOPSY\":               PROSTATIC ADENOCARCINOMA, DWAYNE SCORE 3 + 4 = 7 (GRADE GROUP   2;                  PERCENT PATTERN 4: 20%), DISCONTINUOUSLY INVOLVING 40% OF                  1 CORE.          L:  \"PROSTATE, RIGHT LATERAL APEX, BIOPSY\":             PROSTATIC ADENOCARCINOMA, DWAYNE SCORE 3 + 4 = 7 (GRADE GROUP   2; PERCENT PATTERN 4: 20%), DISCONTINUOUSLY INVOLVING 20% OF                  1 CORE.          M:  \"PROSTATE, REGION OF INTEREST 1 RIGHT APEX, BIOPSY\":             BENIGN PROSTATE TISSUE. N:  \"PROSTATE, REGION OF INTEREST 2 RIGHT ANTERIOR, BIOPSY\":               BENIGN PROSTATE TISSUE. Microscopic Description        Sections demonstrate prostate core biopsies marked with green ink   corresponding to the gross description. Microscopic examination has been   performed and results are incorporated in the above diagnosis. Dr. Mariano Jackson concurs. Specimen(s) Received   A: Prostate Biopsy LLB   B: Prostate Biopsy LLM   C: Prostate Biopsy LLA   D: Prostate Biopsy LB   E: Prostate Biopsy LM   F: Prostate Biopsy LA   G: Prostate Biopsy RB   H: Prostate Biopsy RM   I: Prostate Biopsy RA   J: Prostate Biopsy RLB   K: Prostate Biopsy RLM   L: Prostate Biopsy RLA   M: ELIZABETH 1 RA   N: ELIZABETH 2 Right Anterior           End of Report      Specimen Collected: 11/10/22 09:59 EST Last Resulted: 11/14/22 09:39 EST      Currently the patient is doing well. His AUA score is 1, and he denies any rectal issues. He is leaning towards radiation rather than surgery at this time and is here to discuss the various radiation options available to him now. He does have occasional vertigo and is concerned that he may be possibly unable to lie still for treatment . PAST MEDICAL HISTORY:    Past Medical History:   Diagnosis Date    Chronic pain     back pain    Diverticula of colon     Herpes zoster     History of local excision of skin lesion     from face    Hypercholesteremia 11/13/2013    Joint pain     Microscopic hematuria 11/13/2013    Vertigo     Vitamin D deficiency        The patient denies history of collagen vascular diseases, pacemaker insertion, no prior radiation or prior chemotherapy.      PAST SURGICAL HISTORY:   Past Surgical History:   Procedure Laterality Date    COLONOSCOPY N/A 2/12/2018 COLONOSCOPY/ 24 performed by Ching Rodriguez MD at [facility] ENDOSCOPY    COLONOSCOPY FLX DX W/[facility] WHEN PFRMD  02/12/2018    Dr. Len Anderson in 10 years. COLONOSCOPY W/BIOPSY SINGLE/MULTIPLE  6/7/2010    Dr. Ziyad Christine in 7 years. PROSTATE BIOPSY N/A 11/10/2022    PROSTATE BIOPSY/MRI FUSION performed by Miguel Tenorio MD at [facility] MAIN OR    SKIN LESION EXCISION      face       MEDICATIONS:     Current Outpatient Medications:     Cholecalciferol 50 MCG (2000 UT) TABS, Take by mouth daily, Disp: , Rfl:     pravastatin (PRAVACHOL) 20 MG tablet, TAKE 1 TABLET BY MOUTH EVERY NIGHT, Disp: , Rfl:     ALLERGIES:   No Known Allergies    SOCIAL HISTORY:   Social History     Socioeconomic History    Marital status: Single     Spouse name: Not on file    Number of children: Not on file    Years of education: Not on file    Highest education level: Not on file   Occupational History    Not on file   Tobacco Use    Smoking status: Never    Smokeless tobacco: Never   Substance and Sexual Activity    Alcohol use: Not Currently    Drug use: No    Sexual activity: Not on file   Other Topics Concern    Not on file   Social History Narrative    Not on file     Social Determinants of Health     Financial Resource Strain: Not on file   Food Insecurity: Not on file   Transportation Needs: Not on file   Physical Activity: Not on file   Stress: Not on file   Social Connections: Not on file   Intimate Partner Violence: Not on file   Housing Stability: Not on file       FAMILY HISTORY:   Family History   Problem Relation Age of Onset    Cancer Maternal Aunt     Diabetes Mother     Hypertension Mother     Asthma Mother     Heart Disease Father        REVIEW OF SYSTEMS:   A full 12-point review of systems was completed and was negative unless noted in the history of present illness.     PHYSICAL EXAMINATION:   ECOG Performance status 0  VITAL SIGNS:   Vitals:    12/20/22 0915   BP: (!) 148/84   Pulse: 57   Temp: 97.6 °F (36.4 °C)   SpO2: 100%        General: well developed/nourished adult Male in no acute distress; appears stated age  [de-identified]: normocephalic, atraumatic; EOMI  Neck: supple with full ROM; no cervical lymphadenopathy  Respiratory: normal inspiratory effort, no audible wheezes  Extremities: no cyanosis, clubbing, or edema  Musculoskeletal: mobility intact x4; normal ROM in all joints  Neuro: AOx3; sensation intact x 4; CNII-XII grossly intact  Psych: appropriate affect, insight, and judgement  GI: abdomen soft, non-distended      PATHOLOGY:    I personally reviewed the pathology reports as documented in the HPI. LABORATORY:   Lab Results   Component Value Date/Time     01/05/2022 09:46 AM    K 4.1 01/05/2022 09:46 AM     01/05/2022 09:46 AM    CO2 24 01/05/2022 09:46 AM    BUN 21 01/05/2022 09:46 AM    GFRAA 70 01/05/2022 09:46 AM    ALT 19 01/05/2022 09:46 AM     No results found for: WBC, HGB, HCT, PLT    RADIOLOGY:    I personally reviewed the images and agree with the findings as documented in the HPI. IMPRESSION:  Jacquie Faria is a 79 y.o. male with favorable risk intermediate  grade prostate cancer . I had a long discussion with Jacquie Faria regarding treatment options, specifically the various radiation options available to him, including SBRT, moderately hypo fractionated external beam radiation , HDR or LDR brachytherapy or Proton beam Therapy. I reviewed in detail the anticipated acute and late toxicities of radiation therapy as well as  the logistics of treatment and expected disease control. Jacquie Faria had the opportunity to ask questions which appeared to be answered to their satisfaction and have elected to follow up in February 2023 with repeat PSA and make a decision about treatment. Zakia Rodríguez He is leaning towards moderately hypo fractionated external beam radiation over 5 1/2 weeks.     PLAN:    Consented patient for treatment with external beam radiation after discussing risk, benefits, and side effects from treatment. Reviewed available research treatment and cancer care protocols for which patient may be eligible. Unfortunately there are no matching clinical trials available at this time. Coordinate care and start date with medical oncology. CT Simulation planned for today.       Román Hernandez MD   December 20, 2022

## 2022-12-20 NOTE — PROGRESS NOTES
Consult for Prostrate Cancer:    MRI fusion prostate biopsy=11-10-22  Pomerene score 6/7  Patient arrives today for appointment unaccompanied. AUA=1  Patient expressed concerns over problems with Vertigo and making appts. Patient expressed concerns over back pain issues and laying on a table. Last Colonoscopy per pt. Approx 2/18  RADIATION TEACHING COMPLETED WITH PATIENT  FREQUENTLY ASKED QUESTIONS SHEET PROVIDED TO PATIENT. BOWEL PREP SHEET PROVIDED TO PATIENT AND EXPLAINED. Patient given opportunity for questions. Patient to make an appointment for 3 months with MD.  Patient wanted time to consider options.

## 2023-01-18 ENCOUNTER — NURSE ONLY (OUTPATIENT)
Dept: FAMILY MEDICINE CLINIC | Facility: CLINIC | Age: 68
End: 2023-01-18
Payer: MEDICARE

## 2023-01-18 DIAGNOSIS — Z00.00 LABORATORY EXAMINATION ORDERED AS PART OF A ROUTINE GENERAL MEDICAL EXAMINATION: ICD-10-CM

## 2023-01-18 DIAGNOSIS — E55.9 VITAMIN D DEFICIENCY, UNSPECIFIED: ICD-10-CM

## 2023-01-18 DIAGNOSIS — C61 PROSTATE CANCER (HCC): ICD-10-CM

## 2023-01-18 DIAGNOSIS — E78.00 PURE HYPERCHOLESTEROLEMIA, UNSPECIFIED: Primary | ICD-10-CM

## 2023-01-18 LAB
25(OH)D3 SERPL-MCNC: 33.3 NG/ML (ref 30–100)
ALBUMIN SERPL-MCNC: 4.2 G/DL (ref 3.2–4.6)
ALBUMIN/GLOB SERPL: 1.3 (ref 0.4–1.6)
ALP SERPL-CCNC: 53 U/L (ref 50–136)
ALT SERPL-CCNC: 33 U/L (ref 12–65)
ANION GAP SERPL CALC-SCNC: 8 MMOL/L (ref 2–11)
AST SERPL-CCNC: 17 U/L (ref 15–37)
BILIRUB SERPL-MCNC: 0.9 MG/DL (ref 0.2–1.1)
BILIRUBIN, URINE, POC: NEGATIVE
BLOOD URINE, POC: ABNORMAL
BUN SERPL-MCNC: 21 MG/DL (ref 8–23)
CALCIUM SERPL-MCNC: 10.2 MG/DL (ref 8.3–10.4)
CHLORIDE SERPL-SCNC: 105 MMOL/L (ref 101–110)
CHOLEST SERPL-MCNC: 142 MG/DL
CO2 SERPL-SCNC: 28 MMOL/L (ref 21–32)
CREAT SERPL-MCNC: 1.3 MG/DL (ref 0.8–1.5)
GLOBULIN SER CALC-MCNC: 3.2 G/DL (ref 2.8–4.5)
GLUCOSE SERPL-MCNC: 141 MG/DL (ref 65–100)
GLUCOSE URINE, POC: NEGATIVE
GRANS ABSOLUTE, POC: 3.6 K/UL
GRANULOCYTES %, POC: 60.5 %
HDLC SERPL-MCNC: 34 MG/DL (ref 40–60)
HDLC SERPL: 4.2
HEMATOCRIT, POC: 47.6 %
HEMOGLOBIN, POC: 15 G/DL
KETONES, URINE, POC: NEGATIVE
LDLC SERPL CALC-MCNC: 79.4 MG/DL
LEUKOCYTE ESTERASE, URINE, POC: NEGATIVE
LYMPHOCYTE %, POC: 33.1 %
LYMPHS ABSOLUTE, POC: 2 K/UL
MCH, POC: 32 PG (ref 20–?)
MCHC, POC: 31.5
MCV, POC: 97
MONOCYTE %, POC: 6.4 %
MONOCYTE, ABSOLUTE POC: 0.4 K/UL
MPV, POC: 6.9 FL
NITRITE, URINE, POC: NEGATIVE
PH, URINE, POC: 6.5 (ref 4.6–8)
PLATELET COUNT, POC: 352 K/UL
POTASSIUM SERPL-SCNC: 4.3 MMOL/L (ref 3.5–5.1)
PROT SERPL-MCNC: 7.4 G/DL (ref 6.3–8.2)
PROTEIN,URINE, POC: NEGATIVE
PSA SERPL-MCNC: 5.2 NG/ML
RBC, POC: 4.66 M/UL
RDW, POC: 12.3 %
SODIUM SERPL-SCNC: 141 MMOL/L (ref 133–143)
SPECIFIC GRAVITY, URINE, POC: 1.02 (ref 1–1.03)
TRIGL SERPL-MCNC: 143 MG/DL (ref 35–150)
TSH, 3RD GENERATION: 2.59 UIU/ML (ref 0.36–3.74)
URINALYSIS CLARITY, POC: CLEAR
URINALYSIS COLOR, POC: YELLOW
UROBILINOGEN, POC: NORMAL
VLDLC SERPL CALC-MCNC: 28.6 MG/DL (ref 6–23)
WBC, POC: 6 K/UL

## 2023-01-18 PROCEDURE — 85025 COMPLETE CBC W/AUTO DIFF WBC: CPT | Performed by: FAMILY MEDICINE

## 2023-01-18 PROCEDURE — 81002 URINALYSIS NONAUTO W/O SCOPE: CPT | Performed by: FAMILY MEDICINE

## 2023-01-19 ENCOUNTER — TELEPHONE (OUTPATIENT)
Dept: UROLOGY | Age: 68
End: 2023-01-19

## 2023-01-19 NOTE — TELEPHONE ENCOUNTER
----- Message from Fawad Nath sent at 1/17/2023  9:24 AM EST -----  Regarding: FW: Prostate Cancer    ----- Message -----  From: Prashanth Coleman  Sent: 1/17/2023   9:18 AM EST  To: , #  Subject: Prostate Cancer                                  I was wondering, am I harming myself by waiting until my Feb.24th ( blood draw ) to find out if my [place] numbers have gone up? Can the Prostate Cancer grow fast?  Will probably have the Radiation if needed. Right now, I have  a variety of options  for treatment. I'm afraid if I wait until Feb. 24th, I may not have the options and will have to have the Prostate removed, which I don't want. So, does the cancer grow fast?  Will I always have the option of Radiation? Thank You.   Richard Ogden

## 2023-01-19 NOTE — TELEPHONE ENCOUNTER
Called pt , I told him it is safe to wait until he sees me on 3-3-23. He has seen Dr. Jared Willis of rad onc. Has appt with her as well. He would like to keep those appts.

## 2023-01-27 ENCOUNTER — OFFICE VISIT (OUTPATIENT)
Dept: FAMILY MEDICINE CLINIC | Facility: CLINIC | Age: 68
End: 2023-01-27

## 2023-01-27 VITALS
HEIGHT: 72 IN | DIASTOLIC BLOOD PRESSURE: 76 MMHG | BODY MASS INDEX: 24.38 KG/M2 | WEIGHT: 180 LBS | SYSTOLIC BLOOD PRESSURE: 120 MMHG

## 2023-01-27 DIAGNOSIS — Z00.00 ROUTINE GENERAL MEDICAL EXAMINATION AT A HEALTH CARE FACILITY: Primary | ICD-10-CM

## 2023-01-27 DIAGNOSIS — G43.019 INTRACTABLE MIGRAINE WITHOUT AURA AND WITHOUT STATUS MIGRAINOSUS: ICD-10-CM

## 2023-01-27 DIAGNOSIS — R31.29 MICROSCOPIC HEMATURIA: ICD-10-CM

## 2023-01-27 DIAGNOSIS — Z12.5 ENCOUNTER FOR SCREENING FOR MALIGNANT NEOPLASM OF PROSTATE: ICD-10-CM

## 2023-01-27 DIAGNOSIS — R73.9 ELEVATED BLOOD SUGAR: ICD-10-CM

## 2023-01-27 DIAGNOSIS — E78.00 HYPERCHOLESTEREMIA: ICD-10-CM

## 2023-01-27 DIAGNOSIS — Z00.00 ENCOUNTER FOR GENERAL ADULT MEDICAL EXAMINATION WITHOUT ABNORMAL FINDINGS: ICD-10-CM

## 2023-01-27 DIAGNOSIS — Z00.00 MEDICARE ANNUAL WELLNESS VISIT, SUBSEQUENT: ICD-10-CM

## 2023-01-27 DIAGNOSIS — E78.00 PURE HYPERCHOLESTEROLEMIA, UNSPECIFIED: ICD-10-CM

## 2023-01-27 DIAGNOSIS — C61 PROSTATE CANCER (HCC): ICD-10-CM

## 2023-01-27 DIAGNOSIS — Z13.31 ENCOUNTER FOR SCREENING FOR DEPRESSION: ICD-10-CM

## 2023-01-27 DIAGNOSIS — E55.9 VITAMIN D DEFICIENCY, UNSPECIFIED: ICD-10-CM

## 2023-01-27 DIAGNOSIS — Z13.31 SCREENING FOR DEPRESSION: ICD-10-CM

## 2023-01-27 DIAGNOSIS — Z71.85 IMMUNIZATION COUNSELING: ICD-10-CM

## 2023-01-27 RX ORDER — PRAVASTATIN SODIUM 20 MG
20 TABLET ORAL DAILY
Qty: 90 TABLET | Refills: 3 | Status: SHIPPED | OUTPATIENT
Start: 2023-01-27

## 2023-01-27 ASSESSMENT — MINI MENTAL STATE EXAM
SHOW: PENCIL [OBJECT] ASK: WHAT IS THIS CALLED?: 0
WHAT CITY/TOWN ARE WE IN?: 0
ASK THE PERSON IF HE IS RIGHT OR LEFT-HANDED. TAKE A PIECE OF PAPER AND HOLD IT UP IN
FRONT OF THE PERSON. SAY: TAKE THIS PAPER IN YOUR RIGHT/LEFT HAND (WHICHEVER IS NON-
DOMINANT), SCORE IF PAPER IS PICKED UP IN CORRECT HAND.: 0
SAY: I WOULD LIKE YOU TO REPEAT THIS PHRASE AFTER ME: NO IFS, ANDS, OR BUTS.: 0
WHAT COUNTRY ARE WE IN?: 0
NOW WHAT WERE THE THREE OBJECTS I ASKED YOU TO REMEMBER?: 0
WHAT IS THE NAME OF THIS BUILDING [IN FACILITY]?/WHAT IS THE STREET ADDRESS OF THIS HOUSE [IN HOME]?: 0
WHAT STATE [OR PROVINCE] ARE WE IN?: 0
WHAT FLOOR ARE WE ON [IN FACILITY]?/ WHAT ROOM ARE WE IN [IN HOME]?: 0
SHOW: WRISTWATCH [OBJECT] ASK: WHAT IS THIS CALLED?: 0
WHICH SEASON IS THIS?: 0
SAY: I WOULD LIKE YOU TO COUNT BACKWARD FROM 100 BY SEVENS: 0
SUM ALL MMSE QUESTIONS FOR TOTAL SCORE [OUT OF 30].: 0
SAY: FOLD THE PAPER IN HALF ONCE WITH BOTH HANDS, SCORE IF PAPER IS CORRECTLY FOLDED IN HALF.: 0
SAY: I AM GOING TO NAME THREE OBJECTS. WHEN I AM FINISHED, I WANT YOU TO REPEAT
THEM. REMEMBER WHAT THEY ARE BECAUSE I AM GOING TO ASK YOU TO NAME THEM AGAIN IN
A FEW MINUTES.  SAY THE FOLLOWING WORDS SLOWLY AT 1-SECOND INTERVALS - BALL/ CAR/ MAN [ITERATIONS FOR REPEAT ADMINISTRATION]: 0
WHAT MONTH IS THIS?: 0
PLACE DESIGN, ERASER AND PENCIL IN FRONT OF THE PERSON.  SAY:  COPY THIS DESIGN PLEASE.  SHOW: DESIGN. ALLOW: MULTIPLE TRIES. WAIT UNTIL PERSON IS FINISHED AND HANDS IT BACK. SCORE: ONLY FOR DIAGRAM WITH 4-SIDED FIGURE BETWEEN TWO 5-SIDED FIGURES: 0
WHAT IS TODAY'S DATE?: 0
HAND THE PERSON A PENCIL AND PAPER. SAY: WRITE ANY COMPLETE SENTENCE ON THAT
PIECE OF PAPER. (NOTE: THE SENTENCE MUST MAKE SENSE. IGNORE SPELLING ERRORS): 0
SAY: PUT THE PAPER DOWN ON THE FLOOR, SCORE IF PAPER IS PLACED BACK ON FLOOR: 0
WHAT DAY OF THE WEEK IS THIS?: 0
SAY: READ THE WORDS ON THE PAGE AND THEN DO WHAT IT SAYS. THEN HAND THE PERSON
THE SHEET WITH CLOSE YOUR EYES ON IT. IF THE SUBJECT READS AND DOES NOT CLOSE THEIR EYES, REPEAT UP TO THREE TIMES. SCORE ONLY IF SUBJECT CLOSES EYES.: 0
WHAT YEAR IS THIS?: 0

## 2023-01-27 ASSESSMENT — PATIENT HEALTH QUESTIONNAIRE - PHQ9
SUM OF ALL RESPONSES TO PHQ QUESTIONS 1-9: 0
SUM OF ALL RESPONSES TO PHQ9 QUESTIONS 1 & 2: 0
SUM OF ALL RESPONSES TO PHQ QUESTIONS 1-9: 0
1. LITTLE INTEREST OR PLEASURE IN DOING THINGS: 0
SUM OF ALL RESPONSES TO PHQ QUESTIONS 1-9: 0
2. FEELING DOWN, DEPRESSED OR HOPELESS: 0
SUM OF ALL RESPONSES TO PHQ QUESTIONS 1-9: 0

## 2023-01-27 NOTE — PROGRESS NOTES
Medicare Annual Wellness Visit    Lorena Morelos is here for No chief complaint on file. Assessment & Plan   Routine general medical examination at a health care facility  Prostate cancer [facility])  Pure hypercholesterolemia, unspecified  Vitamin D deficiency, unspecified  Encounter for general adult medical examination without abnormal findings  Encounter for screening for malignant neoplasm of prostate  Hypercholesteremia  Encounter for screening for depression  Intractable migraine without aura and without status migrainosus  Screening for depression  Medicare annual wellness visit, subsequent      Recommendations for Preventive Services Due: see orders and patient instructions/AVS.  Recommended screening schedule for the next 5-10 years is provided to the patient in written form: see Patient Instructions/AVS.     Return for Medicare Annual Wellness Visit in 1 year. Subjective   Patient presents office today for complete physical/Medicare wellness visit being followed by Dr. Emma Camarena for elevated PSA    Patient's complete Health Risk Assessment and screening values have been reviewed and are found in Flowsheets. The following problems were reviewed today and where indicated follow up appointments were made and/or referrals ordered. Positive Risk Factor Screenings with Interventions:                                       Objective   There were no vitals filed for this visit. There is no height or weight on file to calculate BMI.         General Appearance: alert and oriented to person, place and time, well developed and well- nourished, in no acute distress  Skin: warm and dry, no rash or erythema  Head: normocephalic and atraumatic  Eyes: pupils equal, round, and reactive to light, extraocular eye movements intact, conjunctivae normal  ENT: tympanic membrane, external ear and ear canal normal bilaterally, nose without deformity, nasal mucosa and turbinates normal without polyps  Neck: supple and non-tender without mass, no thyromegaly or thyroid nodules, no cervical lymphadenopathy  Pulmonary/Chest: clear to auscultation bilaterally- no wheezes, rales or rhonchi, normal air movement, no respiratory distress  Cardiovascular: normal rate, regular rhythm, normal S1 and S2, no murmurs, rubs, clicks, or gallops, distal pulses intact, no carotid bruits  Abdomen: soft, non-tender, non-distended, normal bowel sounds, no masses or organomegaly  Extremities: no cyanosis, clubbing or edema  Musculoskeletal: normal range of motion, no joint swelling, deformity or tenderness  Neurologic: reflexes normal and symmetric, no cranial nerve deficit, gait, coordination and speech normal       No Known Allergies  Prior to Visit Medications    Medication Sig Taking?  Authorizing Provider   Cholecalciferol 50 MCG (2000 UT) TABS Take by mouth daily  Ar Automatic Reconciliation   pravastatin (PRAVACHOL) 20 MG tablet TAKE 1 TABLET BY MOUTH EVERY NIGHT  Ar Automatic Reconciliation       CareTeam (Including outside providers/suppliers regularly involved in providing care):   Patient Care Team:  Alessio Ku DO as PCP - [place] DO Everardo as PCP - REHABILITATION [facility] Empaneled Provider     Reviewed and updated this visit:         For routine physical exam reviewed his labs answered all his questions recommended checking a hemoglobin A1c and we will call him back with results and plan

## 2023-01-27 NOTE — PATIENT INSTRUCTIONS
Personalized Preventive Plan for Jesus Nguyễn - 1/27/2023  Medicare offers a range of preventive health benefits. Some of the tests and screenings are paid in full while other may be subject to a deductible, co-insurance, and/or copay. Some of these benefits include a comprehensive review of your medical history including lifestyle, illnesses that may run in your family, and various assessments and screenings as appropriate. After reviewing your medical record and screening and assessments performed today your provider may have ordered immunizations, labs, imaging, and/or referrals for you. A list of these orders (if applicable) as well as your Preventive Care list are included within your After Visit Summary for your review. Other Preventive Recommendations:    A preventive eye exam performed by an eye specialist is recommended every 1-2 years to screen for glaucoma; cataracts, macular degeneration, and other eye disorders. A preventive dental visit is recommended every 6 months. Try to get at least 150 minutes of exercise per week or 10,000 steps per day on a pedometer . Order or download the FREE \"Exercise & Physical Activity: Your Everyday Guide\" from [employer]. Call 9-589.397.9409 or search [employer] online. You need 4680-0973 mg of calcium and 1229-0514 IU of vitamin D per day. It is possible to meet your calcium requirement with diet alone, but a vitamin D supplement is usually necessary to meet this goal.  When exposed to the sun, use a sunscreen that protects against both UVA and UVB radiation with an SPF of 30 or greater. Reapply every 2 to 3 hours or after sweating, drying off with a towel, or swimming. Always wear a seat belt when traveling in a car. Always wear a helmet when riding a bicycle or motorcycle.

## 2023-01-28 LAB
EST. AVERAGE GLUCOSE BLD GHB EST-MCNC: 148 MG/DL
HBA1C MFR BLD: 6.8 % (ref 4.8–5.6)

## 2023-02-03 ENCOUNTER — TELEMEDICINE (OUTPATIENT)
Dept: FAMILY MEDICINE CLINIC | Facility: CLINIC | Age: 68
End: 2023-02-03
Payer: MEDICARE

## 2023-02-03 DIAGNOSIS — R73.9 ELEVATED BLOOD SUGAR: Primary | ICD-10-CM

## 2023-02-03 PROCEDURE — 99442 PR PHYS/QHP TELEPHONE EVALUATION 11-20 MIN: CPT | Performed by: FAMILY MEDICINE

## 2023-02-03 ASSESSMENT — ENCOUNTER SYMPTOMS
VOMITING: 0
SHORTNESS OF BREATH: 0
NAUSEA: 0

## 2023-02-03 ASSESSMENT — PATIENT HEALTH QUESTIONNAIRE - PHQ9
SUM OF ALL RESPONSES TO PHQ QUESTIONS 1-9: 0
2. FEELING DOWN, DEPRESSED OR HOPELESS: 0
1. LITTLE INTEREST OR PLEASURE IN DOING THINGS: 0
SUM OF ALL RESPONSES TO PHQ QUESTIONS 1-9: 0
SUM OF ALL RESPONSES TO PHQ9 QUESTIONS 1 & 2: 0

## 2023-02-03 NOTE — PROGRESS NOTES
PROGRESS NOTE  Felicia Ashley is a 76 y.o. male evaluated via telephone on 2/3/2023 for No chief complaint on file. .        Total Time: minutes: 11-20 minutes    Felicia Ashley was evaluated through a synchronous (real-time) audio encounter. Patient identification was verified at the start of the visit. He (or guardian if applicable) is aware that this is a billable service, which includes applicable co-pays. This visit was conducted with the patient's (and/or legal guardian's) verbal consent. He has not had a related appointment within my department in the past 7 days or scheduled within the next 24 hours. The patient was located at Home: [address] [place] [address]. The provider was located at [facility] ([address] [place] [address] Dept): [address] [place] [address] Dr Maile Aguilar [address],  [address]. Note: not billable if this call serves to triage the patient into an appointment for the relevant concern     SUBJECTIVE:   Felicia Ashley is a 76 y.o. male seen for a follow up visit regarding the following chief complaint:     No chief complaint on file. HPI      Past Medical History, Past Surgical History, Family history, Social History, and Medications were all reviewed with the patient today and updated as necessary. Current Outpatient Medications   Medication Sig Dispense Refill    pravastatin (PRAVACHOL) 20 MG tablet Take 1 tablet by mouth daily TAKE 1 TABLET BY MOUTH EVERY NIGHT 90 tablet 3    Cholecalciferol 50 MCG (2000 UT) TABS Take by mouth daily       No current facility-administered medications for this visit.      No Known Allergies  Patient Active Problem List   Diagnosis    Vertigo    Vitamin D deficiency    Joint pain    Hypercholesteremia    Basal cell carcinoma (BCC) of eyelid    Migraine without aura    Microscopic hematuria    Diverticula of colon    Elevated PSA     Past Medical History:   Diagnosis Date    Chronic pain     back pain    Diverticula of colon     Herpes zoster History of local excision of skin lesion     from face    Hypercholesteremia 11/13/2013    Joint pain     Microscopic hematuria 11/13/2013    Prostate cancer ([address]     Vertigo     Vitamin D deficiency      Past Surgical History:   Procedure Laterality Date    COLONOSCOPY N/A 2/12/2018    COLONOSCOPY/ 24 performed by Rhea Ray MD at [facility] ENDOSCOPY    COLONOSCOPY FLX DX W/[facility] REHABILITATION WHEN PFRMD  02/12/2018    Dr. Jennifer Boo in 10 years. COLONOSCOPY W/BIOPSY SINGLE/MULTIPLE  6/7/2010    Dr. Jose David Toure in 7 years. PROSTATE BIOPSY N/A 11/10/2022    PROSTATE BIOPSY/MRI FUSION performed by Juan Carlos Jack MD at [facility] MAIN OR    SKIN LESION EXCISION      face     Family History   Problem Relation Age of Onset    Cancer Maternal Aunt     Diabetes Mother     Hypertension Mother     Asthma Mother     Heart Disease Father      Social History     Tobacco Use    Smoking status: Never     Passive exposure: Never    Smokeless tobacco: Never   Substance Use Topics    Alcohol use: Not Currently         Review of Systems   Constitutional:  Negative for fatigue and fever. Respiratory:  Negative for shortness of breath. Cardiovascular:  Negative for chest pain. Gastrointestinal:  Negative for nausea and vomiting. OBJECTIVE:  There were no vitals taken for this visit. Physical Exam     Medical problems and test results were reviewed with the patient today.      Recent Results (from the past 672 hour(s))   PSA, Diagnostic    Collection Time: 01/18/23  9:24 AM   Result Value Ref Range    PSA 5.2 (H) <4.0 ng/mL   Comprehensive Metabolic Panel    Collection Time: 01/18/23  9:24 AM   Result Value Ref Range    Sodium 141 133 - 143 mmol/L    Potassium 4.3 3.5 - 5.1 mmol/L    Chloride 105 101 - 110 mmol/L    CO2 28 21 - 32 mmol/L    Anion Gap 8 2 - 11 mmol/L    Glucose 141 (H) 65 - 100 mg/dL    BUN 21 8 - 23 MG/DL    Creatinine 1.30 0.8 - 1.5 MG/DL    Est, Glom Filt Rate 60 (L) >60 ml/min/1.73m2    Calcium 10.2 8.3 - 10.4 MG/DL    Total Bilirubin 0.9 0.2 - 1.1 MG/DL    ALT 33 12 - 65 U/L    AST 17 15 - 37 U/L    Alk Phosphatase 53 50 - 136 U/L    Total Protein 7.4 6.3 - 8.2 g/dL    Albumin 4.2 3.2 - 4.6 g/dL    Globulin 3.2 2.8 - 4.5 g/dL    Albumin/Globulin Ratio 1.3 0.4 - 1.6     Lipid Panel    Collection Time: 01/18/23  9:24 AM   Result Value Ref Range    Cholesterol, Total 142 <200 MG/DL    Triglycerides 143 35 - 150 MG/DL    HDL 34 (L) 40 - 60 MG/DL    LDL Calculated 79.4 <100 MG/DL    VLDL Cholesterol Calculated 28.6 (H) 6.0 - 23.0 MG/DL    Chol/HDL Ratio 4.2     TSH    Collection Time: 01/18/23  9:24 AM   Result Value Ref Range    TSH, 3RD GENERATION 2.590 0.358 - 3.740 uIU/mL   Vitamin D 25 Hydroxy    Collection Time: 01/18/23  9:24 AM   Result Value Ref Range    Vit D, 25-Hydroxy 33.3 30.0 - 100.0 ng/mL   AMB POC KTY COMPLETE CBC    Collection Time: 01/18/23  9:30 AM   Result Value Ref Range    WBC, POC 6.0 K/uL    Lymphocyte % 33.1 %    Monocyte % 6.4 %    Granulocytes %, POC 60.5 %    Lymphs Abs 2.0 K/uL    Monocyte Absolute, POC 0.4 K/uL    Granulocytes Abs 3.6 K/uL    RBC, POC 4.66 M/uL    Hemoglobin, POC 15.0 G/DL    Hematocrit, POC 47.6 %    MCV 97.0     MCH 32.0 20 pg    MCHC 31.5     RDW, POC 12.3 %    Platelet Count,  K/UL    MPV POC 6.9 fL   AMB POC URINALYSIS DIP STICK MANUAL W/O MICRO    Collection Time: 01/18/23  9:30 AM   Result Value Ref Range    Color (UA POC) Yellow     Clarity (UA POC) Clear     Glucose, Urine, POC Negative Negative    Bilirubin, Urine, POC Negative Negative    Ketones, Urine, POC Negative Negative    Specific Gravity, Urine, POC 1.020 1.001 - 1.035    Blood (UA POC) 1+ Negative    pH, Urine, POC 6.5 4.6 - 8.0    Protein, Urine, POC Negative Negative    Urobilinogen, POC Normal     Nitrite, Urine, POC Negative Negative    Leukocyte Esterase, Urine, POC Negative Negative   Hemoglobin A1C    Collection Time: 01/27/23  3:06 PM   Result Value Ref Range    Hemoglobin A1C 6.8 (H) 4.8 - 5.6 %    eAG 148 mg/dL       ASSESSMENT and PLAN    Visit Diagnoses and Associated Orders       Elevated blood sugar    -  Primary    Hemoglobin A1C [86860 Custom]   - Future Order                     Diagnosis Orders   1.  Elevated blood sugar  Hemoglobin A1C      , Diagnoses and all orders for this visit:    Elevated blood sugar  -     Hemoglobin A1C; Future    , Discussed patient's A1c recommended a low-carb low sugar diet and exercise we will recheck his labs again in 4 to 6 months we will have staff reach out to him and let him know about the diet patient wanted know about a nutritionist and after long lengthy discussion we will to start with diet and exercise and we will readdress to nutritionist with the next A1c

## 2023-02-23 DIAGNOSIS — C61 PROSTATE CANCER (HCC): Primary | ICD-10-CM

## 2023-02-24 ENCOUNTER — NURSE ONLY (OUTPATIENT)
Dept: UROLOGY | Age: 68
End: 2023-02-24

## 2023-02-24 DIAGNOSIS — C61 PROSTATE CANCER (HCC): ICD-10-CM

## 2023-02-24 LAB — PSA SERPL-MCNC: 4.9 NG/ML

## 2023-03-03 ENCOUNTER — OFFICE VISIT (OUTPATIENT)
Dept: UROLOGY | Age: 68
End: 2023-03-03

## 2023-03-03 DIAGNOSIS — C61 PROSTATE CANCER (HCC): ICD-10-CM

## 2023-03-03 DIAGNOSIS — R97.20 ELEVATED PSA: Primary | ICD-10-CM

## 2023-03-03 LAB
BILIRUBIN, URINE, POC: NEGATIVE
BLOOD URINE, POC: NORMAL
GLUCOSE URINE, POC: NEGATIVE
KETONES, URINE, POC: NEGATIVE
LEUKOCYTE ESTERASE, URINE, POC: NEGATIVE
NITRITE, URINE, POC: NEGATIVE
PH, URINE, POC: 6 (ref 4.6–8)
PROTEIN,URINE, POC: NEGATIVE
SPECIFIC GRAVITY, URINE, POC: 1.02 (ref 1–1.03)
URINALYSIS CLARITY, POC: CLEAR
URINALYSIS COLOR, POC: YELLOW
UROBILINOGEN, POC: NORMAL

## 2023-03-03 NOTE — PROGRESS NOTES
[facility]  [address]  [address]  [facility], [address] Joseline Savage  : 1955    Chief Complaint   Patient presents with    Other     Discuss PSA lab results. States has waking up 3 days in a row with headaches concern that is related to prostate cancer. SHAZIA Velasoc is a 76 y.o. male With elevated PSA. PSA 4.9 in -, 4.8 in -. PSA was 3.4 in -18, 4.1 in 12-19, 3.1 in -20, 3.8 in -20. He denies any significant LUTS. No family hx of prostate cancer. He stays active at the [facility] 94 showed 2+ prostate without nodules. In , total PSA 5.9 with 23.7% free PSA. MRI 22:   FINDINGS:   Prostate gland size: 4.9 cm transverse x 4.2 cm AP x 4.6 cm craniocaudal.           Peripheral zone:   Lesion 1: There is 0.8 cm lesion with focal homogeneous hypointensity on the ADC   map (series 550 image 9), located on the posterior right peripheral zone at 7   o'clock 1 cm from the midline. There is associated hyperintensity on the high b   value diffusion-weighted sequence. There is associated focal hypointensity on   the T2 sequences. Analysis of the dynamic pre- and postgadolinium kinetics   demonstrates findings positive for focal, early enhancement. PI-RADS   classification: 4 (high probability for the presence of clinically significant   cancer). Transition zone: Signal changes consistent with benign prostatic hypertrophy are   noted. Lesion 2: There is a 0.7 cm focal homogeneously T2 hypointense lesion (series 3   image 15), located on the right para midline gland near the apex at 9 o'clock   less than 1 cm from the midline. There is associated focal hypointensity on the   ADC map, and associated hyperintensity on the high b value diffusion-weighted   sequence. Analysis of the dynamic pre- and postgadolinium kinetics demonstrates   findings positive for focal, early enhancement.  PI-RADS classification: 4 (high   probability for the presence of clinically significant cancer). The prostatic capsule appears intact. The rectoprostatic angle and neurovascular   bundles are unremarkable. The seminal vesicles are symmetric and unremarkable. The urinary bladder is unremarkable. Remainder of the pelvis: No enlarged lymph nodes are identified in the pelvis. No aggressive appearing lesions in the visualized bony pelvis. Impression   1. Lesion 1 (PI-RADS 4): A 0.8 cm lesion located at the posterior right   peripheral zone, 7 o'clock, has a PI-RADS classification 4, high probability for   a clinically significant cancer). 2. Lesion 2 (PI-RADS 4): A 0.7 cm lesion located at the right para midline gland   near the apex, 9 o'clock, has a PI-RADS classification 4, high probability for a   clinically significant cancer). 3. There are changes of benign prostatic hypertrophy. MRI fusion prostate biopsy 11-10-22: PROSTATE VOLUME:  55 gm  PSA 5.9 PSAD 0.11  Path\": DIAGNOSIS        A:  \"PROSTATE, LEFT LATERAL BASE, BIOPSY\":               BENIGN PROSTATE TISSUE. B:  \"PROSTATE, LEFT LATERAL MID, BIOPSY\":               PROSTATIC ADENOCARCINOMA, DWAYNE SCORE 3 + 3 = 6 (GRADE GROUP   1),                  DISCONTINUOUSLY INVOLVING 30% OF 1 CORE.          C:  \"PROSTATE, LEFT LATERAL APEX, BIOPSY\":               BENIGN PROSTATE TISSUE. D:  \"PROSTATE, LEFT BASE, BIOPSY\":               BENIGN PROSTATE TISSUE. E:  \"PROSTATE, LEFT MID, BIOPSY\":               BENIGN PROSTATE TISSUE. F:  \"PROSTATE, LEFT APEX, BIOPSY\":               BENIGN PROSTATE TISSUE. G:  \"PROSTATE, RIGHT BASE, BIOPSY\":               BENIGN PROSTATE TISSUE. H:  \"PROSTATE, RIGHT MID, BIOPSY\":               BENIGN PROSTATE TISSUE.           I:  \"PROSTATE, RIGHT APEX, BIOPSY\":               PROSTATE TISSUE WITH FOCUS OF ATYPICAL GLANDS SUSPICIOUS FOR ADENOCARCINOMA. J:  \"PROSTATE, RIGHT LATERAL BASE, BIOPSY\":               BENIGN PROSTATE TISSUE. K:  \"PROSTATE, RIGHT LATERAL MID, BIOPSY\":               PROSTATIC ADENOCARCINOMA, DWAYNE SCORE 3 + 4 = 7 (GRADE GROUP   2;                  PERCENT PATTERN 4: 20%), DISCONTINUOUSLY INVOLVING 40% OF                  1 CORE.          L:  \"PROSTATE, RIGHT LATERAL APEX, BIOPSY\":             PROSTATIC ADENOCARCINOMA, DWAYNE SCORE 3 + 4 = 7 (GRADE GROUP   2;                  PERCENT PATTERN 4: 20%), DISCONTINUOUSLY INVOLVING 20% OF                  1 CORE.          M:  \"PROSTATE, REGION OF INTEREST 1 RIGHT APEX, BIOPSY\":             BENIGN PROSTATE TISSUE. N:  \"PROSTATE, REGION OF INTEREST 2 RIGHT ANTERIOR, BIOPSY\":               BENIGN PROSTATE TISSUE. Stage T1c, grade 3+4=7 prostate cancer. Paticia Beer PSA 5.2 in 1-23, 4.9 in 2-23. Has seen Dr. Ulysses Parker for discuss radiation therapy. Past Medical History:   Diagnosis Date    Chronic pain     back pain    Diverticula of colon     Herpes zoster     History of local excision of skin lesion     from face    Hypercholesteremia 11/13/2013    Joint pain     Microscopic hematuria 11/13/2013    Prostate [address] ([facility] [address]     Vertigo     Vitamin D deficiency      Past Surgical History:   Procedure Laterality Date    COLONOSCOPY N/A 2/12/2018    COLONOSCOPY/ 24 performed by Arturo Gonzales MD at [facility] ENDOSCOPY    COLONOSCOPY FLX DX W/[facility] REHABILITATION WHEN PFRMD  02/12/2018    Dr. Maral Ojeda in 10 years. COLONOSCOPY W/BIOPSY SINGLE/MULTIPLE  6/7/2010    Dr. Maron Dancer in 7 years.     PROSTATE BIOPSY N/A 11/10/2022    PROSTATE BIOPSY/MRI FUSION performed by Erin Sher MD at [facility] MAIN OR    SKIN LESION EXCISION      face     Current Outpatient Medications   Medication Sig Dispense Refill    pravastatin (PRAVACHOL) 20 MG tablet Take 1 tablet by mouth daily TAKE 1 TABLET BY MOUTH EVERY NIGHT 90 tablet 3    Cholecalciferol 50 MCG (2000 UT) TABS Take by mouth daily       No current facility-administered medications for this visit. No Known Allergies  Social History     Socioeconomic History    Marital status: Single     Spouse name: Not on file    Number of children: Not on file    Years of education: Not on file    Highest education level: Not on file   Occupational History    Not on file   Tobacco Use    Smoking status: Never     Passive exposure: Never    Smokeless tobacco: Never   Vaping Use    Vaping Use: Never used   Substance and Sexual Activity    Alcohol use: Not Currently    Drug use: No    Sexual activity: Not on file   Other Topics Concern    Not on file   Social History Narrative    Not on file     Social Determinants of Health     Financial Resource Strain: Not on file   Food Insecurity: Not on file   Transportation Needs: Not on file   Physical Activity: Not on file   Stress: Not on file   Social Connections: Not on file   Intimate Partner Violence: Not on file   Housing Stability: Not on file     Family History   Problem Relation Age of Onset    Cancer Maternal Aunt     Diabetes Mother     Hypertension Mother     Asthma Mother     Heart Disease Father        ROS    There were no vitals taken for this visit. Urinalysis  UA - Dipstick  Results for orders placed or performed in visit on 09/12/22   AMB POC URINALYSIS DIP STICK AUTO W/O MICRO   Result Value Ref Range    Color (UA POC)      Clarity (UA POC)      Glucose, Urine,  Negative    Bilirubin, Urine, POC Negative Negative    KETONES, Urine, POC Negative Negative    Specific Gravity, Urine, POC 1.025 1.001 - 1.035    Blood (UA POC) Small Negative    pH, Urine, POC 5.5 4.6 - 8.0    Protein, Urine, POC Trace Negative    Urobilinogen, POC Normal     Nitrite, Urine, POC Negative Negative    Leukocyte Esterase, Urine, POC Negative Negative       UA - Micro  WBC - 0  RBC - 0  Bacteria - 0  Epith - 0    Physical Exam    Assessment and Plan    Myles Olmedo was seen today for other.     Diagnoses and all orders for this visit:    Elevated PSA  -     AMB POC URINALYSIS DIP STICK MANUAL W/O MICRO    Prostate cancer ([facility] [address]  -     AMB POC URINALYSIS DIP STICK MANUAL W/O MICRO   He is going to see  and have radiation therapy. Follow-up and Dispositions    Return for call patient to arrange follow-up.

## 2023-03-14 ENCOUNTER — HOSPITAL ENCOUNTER (OUTPATIENT)
Dept: RADIATION ONCOLOGY | Age: 68
Setting detail: RECURRING SERIES
Discharge: HOME | End: 2023-03-17
Payer: MEDICARE

## 2023-03-14 ENCOUNTER — APPOINTMENT (OUTPATIENT)
Dept: RADIATION ONCOLOGY | Age: 68
End: 2023-03-14
Payer: MEDICARE

## 2023-03-14 VITALS
HEART RATE: 55 BPM | RESPIRATION RATE: 16 BRPM | TEMPERATURE: 97.7 F | BODY MASS INDEX: 24.29 KG/M2 | SYSTOLIC BLOOD PRESSURE: 148 MMHG | DIASTOLIC BLOOD PRESSURE: 78 MMHG | OXYGEN SATURATION: 97 % | WEIGHT: 179.1 LBS

## 2023-03-14 PROCEDURE — 99211 OFF/OP EST MAY X REQ PHY/QHP: CPT

## 2023-03-14 NOTE — PROGRESS NOTES
Patient: Penelope Hernandez MRN: [id number]  SSN: [id number]    YOB: 1955  Age: 76 y.o. Sex: male      Other Providers:  Anabel Carvajal    DIAGNOSIS: cT1c, N0, m0 GG 2 adenocarcinoma of the prostate. PREVIOUS TREATMENT:  none    INTERVAL HISTORY:  Penelope Hernandez is a 76 y.o. male with favorable intermediate risk prostate cancer. He has decided to proceed with hypofractionated XRT. UPDATED PAST MEDICAL HISTORY:  Since our prior encounter, Penelope Hernandez has not been hospitalized. There have been no significant changes to the medical history. MEDICATIONS:     Current Outpatient Medications:     pravastatin (PRAVACHOL) 20 MG tablet, Take 1 tablet by mouth daily TAKE 1 TABLET BY MOUTH EVERY NIGHT, Disp: 90 tablet, Rfl: 3    Cholecalciferol 50 MCG (2000 UT) TABS, Take by mouth daily, Disp: , Rfl:     ALLERGIES:   No Known Allergies    PHYSICAL EXAMINATION:   ECOG Performance status 0  VITAL SIGNS:   Vitals:    03/14/23 0922   BP: (!) 148/78   Pulse: 55   Resp: 16   Temp: 97.7 °F (36.5 °C)   SpO2: 97%        General: well developed/nourished adult Male in no acute distress; appears stated age  [de-identified]: normocephalic, atraumatic; EOMI  Extremities: no cyanosis, clubbing, or edema  Musculoskeletal: mobility intact x4; normal ROM in all joints  Neuro: AOx3; sensation intact x 4; CNII-XII grossly intact  Psych: appropriate affect, insight, and judgement    LABORATORY:   Lab Results   Component Value Date/Time     01/18/2023 09:24 AM    K 4.3 01/18/2023 09:24 AM     01/18/2023 09:24 AM    CO2 28 01/18/2023 09:24 AM    BUN 21 01/18/2023 09:24 AM    GFRAA 70 01/05/2022 09:46 AM    GLOB 3.2 01/18/2023 09:24 AM    ALT 33 01/18/2023 09:24 AM     No results found for: WBC, HGB, HCT, PLT    RADIOLOGY:No new imaging since his last visit. [unfilled]    IMPRESSION:  Penelope Hernandez is a 76 y.o. male ECOC 0 with favorable intermediate risk prostate cancer.  We have discussed the risk and benefits of multiple therapies including surgery and all radiation options. We have also discussed the role of Decipher testing. Mr. Michael is not interested in considering ADT so Decipher testing will not be done. He wishes to proceed with XRT.The details of this were discussed in detail including side effects and potential complications.    PLAN:    1)Simulation for XRT.  70Gy in 28 fractions will be delivered to the prostate and 1cm medial SV.  2)     Portions of this note were copied from prior encounters and reviewed for accuracy, currency, and represent documentation and tasks completed during this encounter.  I verify and attest these portions to be unchanged from prior visits.    Olayinka Steinberg MD  03/14/23

## 2023-03-14 NOTE — PROGRESS NOTES
3 Month Follow Up:  -Prostate  -Treatment Discussion     Patient presents with no complaints or concerns. He states he has decided to receive Radiation Treatments.  -Patient has signed consents today.     Saira Luis [facility]

## 2023-03-16 ENCOUNTER — HOSPITAL ENCOUNTER (OUTPATIENT)
Dept: RADIATION ONCOLOGY | Age: 68
Setting detail: RECURRING SERIES
End: 2023-03-16
Payer: MEDICARE

## 2023-03-17 ENCOUNTER — HOSPITAL ENCOUNTER (OUTPATIENT)
Dept: RADIATION ONCOLOGY | Age: 68
Setting detail: RECURRING SERIES
End: 2023-03-17
Payer: MEDICARE

## 2023-03-17 PROCEDURE — 77300 RADIATION THERAPY DOSE PLAN: CPT

## 2023-03-17 PROCEDURE — 77399 UNLISTED PX MED RADJ PHYSICS: CPT

## 2023-03-17 PROCEDURE — 77338 DESIGN MLC DEVICE FOR IMRT: CPT

## 2023-03-17 PROCEDURE — 77301 RADIOTHERAPY DOSE PLAN IMRT: CPT

## 2023-03-27 ENCOUNTER — HOSPITAL ENCOUNTER (OUTPATIENT)
Dept: RADIATION ONCOLOGY | Age: 68
Setting detail: RECURRING SERIES
Discharge: HOME | End: 2023-03-30
Payer: MEDICARE

## 2023-03-27 PROCEDURE — 77385 HC NTSTY MODUL RAD TX DLVR SMPL: CPT

## 2023-03-28 ENCOUNTER — HOSPITAL ENCOUNTER (OUTPATIENT)
Dept: RADIATION ONCOLOGY | Age: 68
Setting detail: RECURRING SERIES
Discharge: HOME | End: 2023-03-31
Payer: MEDICARE

## 2023-03-28 PROCEDURE — 77385 HC NTSTY MODUL RAD TX DLVR SMPL: CPT

## 2023-03-29 ENCOUNTER — HOSPITAL ENCOUNTER (OUTPATIENT)
Dept: RADIATION ONCOLOGY | Age: 68
Setting detail: RECURRING SERIES
Discharge: HOME | End: 2023-04-01
Payer: MEDICARE

## 2023-03-29 PROCEDURE — 77385 HC NTSTY MODUL RAD TX DLVR SMPL: CPT

## 2023-03-30 ENCOUNTER — HOSPITAL ENCOUNTER (OUTPATIENT)
Dept: RADIATION ONCOLOGY | Age: 68
Setting detail: RECURRING SERIES
Discharge: HOME | End: 2023-04-02
Payer: MEDICARE

## 2023-03-30 PROCEDURE — 77385 HC NTSTY MODUL RAD TX DLVR SMPL: CPT

## 2023-03-31 ENCOUNTER — HOSPITAL ENCOUNTER (OUTPATIENT)
Dept: RADIATION ONCOLOGY | Age: 68
Setting detail: RECURRING SERIES
Discharge: HOME | End: 2023-04-03
Payer: MEDICARE

## 2023-03-31 PROCEDURE — 77385 HC NTSTY MODUL RAD TX DLVR SMPL: CPT

## 2023-03-31 NOTE — PROGRESS NOTES
RADIATION ONCOLOGY ON-TREATMENT VISIT  03/31/23    Diagnosis: cT1c, N0 GG-2 adenocarcinoma the prostate. His PSA was only minimally elevated at 5.2 ng/mL. ,  He has favorable intermediate risk disease. Cancer Staging  No matching staging information was found for the patient. This is a 76 y.o. male who is currently receiving XRT. Current RT dose: 1250/7000 Gy in 5/28 fractions. No concurrent systemic therapy    Subjective: The patient is complaining of some diarrhea which is probably due to his bowel prep medication. Week 1:    Objective: There were no vitals filed for this visit. Pain 0/10    General: Alert and conversant, in NAD  Skin: No change    Assessment:  Patient is tolerating radiation therapy well with no problems other than some diarrhea which may be related to the medication he was given for bowel prep    Plan:  -Continue RT as planned. -Treatment images reviewed. -The patient has a documented plan of care to address pain. Pain is not present.  -We have encouraged him to discontinue his bowel prep and see if his bowels regulate.       Christoph Steele MD  03/31/23

## 2023-04-02 ENCOUNTER — CLINICAL DOCUMENTATION (OUTPATIENT)
Dept: FAMILY MEDICINE CLINIC | Facility: CLINIC | Age: 68
End: 2023-04-02

## 2023-04-02 ENCOUNTER — TELEPHONE (OUTPATIENT)
Dept: RADIATION ONCOLOGY | Age: 68
End: 2023-04-02

## 2023-04-02 RX ORDER — PHENAZOPYRIDINE HYDROCHLORIDE 200 MG/1
200 TABLET, FILM COATED ORAL 3 TIMES DAILY PRN
Qty: 15 TABLET | Refills: 0 | Status: SHIPPED | OUTPATIENT
Start: 2023-04-02 | End: 2023-04-07

## 2023-04-02 RX ORDER — TAMSULOSIN HYDROCHLORIDE 0.4 MG/1
0.4 CAPSULE ORAL DAILY
Qty: 30 CAPSULE | Refills: 3 | Status: SHIPPED | OUTPATIENT
Start: 2023-04-02

## 2023-04-02 NOTE — TELEPHONE ENCOUNTER
RADIATION ONCOLOGY ON-TREATMENT VISIT  04/02/23    Diagnosis: cT1c, N0 GG-2 adenocarcinoma the prostate. His PSA was only minimally elevated at 5.2 ng/mL. ,  He has favorable intermediate risk disease. Cancer Staging   No matching staging information was found for the patient. This is a 76 y.o. male who is currently receiving XRT. Current RT dose: 1250/7000 Gy in 5/28 fractions. No concurrent systemic therapy    Subjective:   Mr. Jesika Cabrera contacted the on call line due to dyuria, soreness in the scrotum, and hesitancy with urinating over the weekend. He did go to urgent care and a urinalysis was negative for infection, positive for hematuria. He has no fevers or chills. Assessment:  Patient is experiencing symptoms consistent with radiation treatment. Plan:  -Start Flomax 0.4mg QHS for hesitancy, cautioned regarding dizziness with starting this medication. Will increase as needed. -Recommended Ibuprofen 800mg three times daily as needed for dysuria and pyridium 200mg three times daily as needed for dysuria.  -Patient will let us know next week if symptoms have improved.       Darlene Henson MD  04/02/23

## 2023-04-02 NOTE — PROGRESS NOTES
4/2/23 10:20 AM I am the provider on call and received a call from the answering service that the pt had a concern. I called the pt back. The pt has prostate cancer. Is under care of [facility] Radiation Oncology. The pt reports having some dysuria and pain in his scrotum for past few days. The pt denies any fevers, chills, hematuria, flank pain, N/V/D/abd pain associated. I recommended that pt go to [facility] to be seen as he may have a UTI/acute prostatitis vs complications of his prostate cancer treatment. Pt agrees to go to [facility] at this time for further evaluation and agrees to call his PCP-Dr. Christiano Valles in AM to make close f/u. Mark Burdick MS, APRN, FNP-C

## 2023-04-03 ENCOUNTER — TELEPHONE (OUTPATIENT)
Dept: FAMILY MEDICINE CLINIC | Facility: CLINIC | Age: 68
End: 2023-04-03

## 2023-04-03 ENCOUNTER — HOSPITAL ENCOUNTER (OUTPATIENT)
Dept: RADIATION ONCOLOGY | Age: 68
Setting detail: RECURRING SERIES
Discharge: HOME | End: 2023-04-06
Payer: MEDICARE

## 2023-04-03 PROCEDURE — 77385 HC NTSTY MODUL RAD TX DLVR SMPL: CPT

## 2023-04-03 PROCEDURE — 77336 RADIATION PHYSICS CONSULT: CPT

## 2023-04-03 NOTE — TELEPHONE ENCOUNTER
----- Message from Rosetta Hoda sent at 4/3/2023 10:13 AM EDT -----  Subject: Message to Provider    QUESTIONS  Information for Provider? Pt calling in to let provider have an update   that he's starting his second week of radiation treatment. He is now also   taking Flomax and Phenazopyriden. Patient just wanted provider to be   aware.   ---------------------------------------------------------------------------  --------------  [id number] [employer] [facility]  [phone]; OK to leave message on voicemail  ---------------------------------------------------------------------------  --------------  SCRIPT ANSWERS  Relationship to Patient?  Self Advance Care Planning    Advance Care Planning (ACP) Provider Conversation Snapshot    Date of ACP Conversation: 07/26/18  Persons included in Conversation:  patient  Length of ACP Conversation in minutes:  <16 minutes (Non-Billable)    Authorized Decision Maker (if patient is incapable of making informed decisions): This person is:    Other Legally Authorized Decision Maker (e.g. Next of Kin)  , daughter-Juliane, nephew-Germán        For Patients with Decision Making Capacity:   Values/Goals: Exploration of values, goals, and preferences if recovery is not expected, even with continued medical treatment in the event of:  Imminent death  Severe, permanent brain injury    Conversation Outcomes / Follow-Up Plan:   Reviewed existing Advance Directive

## 2023-04-04 ENCOUNTER — HOSPITAL ENCOUNTER (OUTPATIENT)
Dept: RADIATION ONCOLOGY | Age: 68
Setting detail: RECURRING SERIES
Discharge: HOME | End: 2023-04-07
Payer: MEDICARE

## 2023-04-04 PROCEDURE — 77385 HC NTSTY MODUL RAD TX DLVR SMPL: CPT

## 2023-04-05 ENCOUNTER — HOSPITAL ENCOUNTER (OUTPATIENT)
Dept: RADIATION ONCOLOGY | Age: 68
Setting detail: RECURRING SERIES
Discharge: HOME | End: 2023-04-08
Payer: MEDICARE

## 2023-04-05 PROCEDURE — 77385 HC NTSTY MODUL RAD TX DLVR SMPL: CPT

## 2023-04-07 RX ORDER — PHENAZOPYRIDINE HYDROCHLORIDE 200 MG/1
200 TABLET, FILM COATED ORAL 3 TIMES DAILY PRN
Qty: 90 TABLET | Refills: 0 | Status: SHIPPED | OUTPATIENT
Start: 2023-04-07 | End: 2023-05-07

## 2023-04-13 ENCOUNTER — HOSPITAL ENCOUNTER (OUTPATIENT)
Dept: RADIATION ONCOLOGY | Age: 68
Setting detail: RECURRING SERIES
Discharge: HOME | End: 2023-04-16
Payer: MEDICARE

## 2023-04-13 PROCEDURE — 77385 HC NTSTY MODUL RAD TX DLVR SMPL: CPT

## 2023-04-14 ENCOUNTER — HOSPITAL ENCOUNTER (OUTPATIENT)
Dept: RADIATION ONCOLOGY | Age: 68
Setting detail: RECURRING SERIES
Discharge: HOME | End: 2023-04-17
Payer: MEDICARE

## 2023-04-14 PROCEDURE — 77385 HC NTSTY MODUL RAD TX DLVR SMPL: CPT

## 2023-04-17 ENCOUNTER — HOSPITAL ENCOUNTER (OUTPATIENT)
Dept: RADIATION ONCOLOGY | Age: 68
Setting detail: RECURRING SERIES
Discharge: HOME | End: 2023-04-20
Payer: MEDICARE

## 2023-04-17 PROCEDURE — 77385 HC NTSTY MODUL RAD TX DLVR SMPL: CPT

## 2023-04-17 PROCEDURE — 77336 RADIATION PHYSICS CONSULT: CPT

## 2023-04-18 ENCOUNTER — HOSPITAL ENCOUNTER (OUTPATIENT)
Dept: RADIATION ONCOLOGY | Age: 68
Setting detail: RECURRING SERIES
Discharge: HOME | End: 2023-04-21
Payer: MEDICARE

## 2023-04-18 PROCEDURE — 77385 HC NTSTY MODUL RAD TX DLVR SMPL: CPT

## 2023-04-19 ENCOUNTER — HOSPITAL ENCOUNTER (OUTPATIENT)
Dept: RADIATION ONCOLOGY | Age: 68
Setting detail: RECURRING SERIES
Discharge: HOME | End: 2023-04-22
Payer: MEDICARE

## 2023-04-19 PROCEDURE — 77385 HC NTSTY MODUL RAD TX DLVR SMPL: CPT

## 2023-04-20 ENCOUNTER — HOSPITAL ENCOUNTER (OUTPATIENT)
Dept: RADIATION ONCOLOGY | Age: 68
Setting detail: RECURRING SERIES
End: 2023-04-20
Payer: MEDICARE

## 2023-04-20 PROCEDURE — 77385 HC NTSTY MODUL RAD TX DLVR SMPL: CPT

## 2023-04-21 ENCOUNTER — HOSPITAL ENCOUNTER (OUTPATIENT)
Dept: RADIATION ONCOLOGY | Age: 68
Setting detail: RECURRING SERIES
End: 2023-04-21
Payer: MEDICARE

## 2023-04-21 PROCEDURE — 77385 HC NTSTY MODUL RAD TX DLVR SMPL: CPT

## 2023-04-21 NOTE — PROGRESS NOTES
RADIATION ONCOLOGY ON-TREATMENT VISIT  04/21/23    Diagnosis:cT1c, N0 GG2 carcinoma with slight elevation of the PSA at 5.2 ng/mL. Cancer Staging   No matching staging information was found for the patient. This is a 76 y.o. male who is currently receiving hypofractionated external beam radiation therapy for a favorable intermediate risk carcinoma of the prostate. He continues to have some dysuria and frequency despite Azo and Flomax once a day. Nocturia x3-4 as well as hesitancy and frequency throughout the day. We have encouraged him to go to a twice daily regimen on his Flomax and we have counseled him regarding possible orthostatic hypotension. Other than his urinary symptoms he is without complaints and notes a 0/10 pain score and no GI symptoms. Current RT dose: 50/70 Gy in 20/28 fractions. No concurrent systemic therapy    Subjective: He is having urinary symptoms as described above. Week 1:    Objective: There were no vitals filed for this visit. Pain 0/10    General: Alert and conversant, in NAD  Skin: Thin reaction noted    Assessment:  Patient is tolerating radiation therapy well with minor irritative bladder symptoms but he is doing well overall with an ECOG performance status of 0. Plan:  -We will adjust his Flomax to twice daily  -Continue RT as planned. -Treatment images reviewed. -The patient has a documented plan of care to address pain. Pain is not present.       Vaishali Hensley MD  04/21/23

## 2023-04-24 ENCOUNTER — APPOINTMENT (OUTPATIENT)
Dept: RADIATION ONCOLOGY | Age: 68
End: 2023-04-24
Payer: MEDICARE

## 2023-04-24 PROCEDURE — 77336 RADIATION PHYSICS CONSULT: CPT

## 2023-04-24 PROCEDURE — 77385 HC NTSTY MODUL RAD TX DLVR SMPL: CPT

## 2023-04-25 ENCOUNTER — APPOINTMENT (OUTPATIENT)
Dept: RADIATION ONCOLOGY | Age: 68
End: 2023-04-25
Payer: MEDICARE

## 2023-04-25 PROCEDURE — 77385 HC NTSTY MODUL RAD TX DLVR SMPL: CPT

## 2023-04-26 ENCOUNTER — APPOINTMENT (OUTPATIENT)
Dept: RADIATION ONCOLOGY | Age: 68
End: 2023-04-26
Payer: MEDICARE

## 2023-04-26 PROCEDURE — 77385 HC NTSTY MODUL RAD TX DLVR SMPL: CPT

## 2023-04-27 ENCOUNTER — APPOINTMENT (OUTPATIENT)
Dept: RADIATION ONCOLOGY | Age: 68
End: 2023-04-27
Payer: MEDICARE

## 2023-04-27 PROCEDURE — 77385 HC NTSTY MODUL RAD TX DLVR SMPL: CPT

## 2023-04-28 ENCOUNTER — APPOINTMENT (OUTPATIENT)
Dept: RADIATION ONCOLOGY | Age: 68
End: 2023-04-28
Payer: MEDICARE

## 2023-04-28 PROCEDURE — 77385 HC NTSTY MODUL RAD TX DLVR SMPL: CPT

## 2023-05-01 ENCOUNTER — HOSPITAL ENCOUNTER (OUTPATIENT)
Dept: RADIATION ONCOLOGY | Age: 68
Setting detail: RECURRING SERIES
Discharge: HOME | End: 2023-05-04
Payer: MEDICARE

## 2023-05-01 PROCEDURE — 77336 RADIATION PHYSICS CONSULT: CPT

## 2023-05-01 PROCEDURE — 77385 HC NTSTY MODUL RAD TX DLVR SMPL: CPT

## 2023-05-02 ENCOUNTER — HOSPITAL ENCOUNTER (OUTPATIENT)
Dept: RADIATION ONCOLOGY | Age: 68
Setting detail: RECURRING SERIES
Discharge: HOME | End: 2023-05-05
Payer: MEDICARE

## 2023-05-02 PROCEDURE — 77385 HC NTSTY MODUL RAD TX DLVR SMPL: CPT

## 2023-05-03 ENCOUNTER — HOSPITAL ENCOUNTER (OUTPATIENT)
Dept: RADIATION ONCOLOGY | Age: 68
Setting detail: RECURRING SERIES
Discharge: HOME | End: 2023-05-06
Payer: MEDICARE

## 2023-05-03 PROCEDURE — 77385 HC NTSTY MODUL RAD TX DLVR SMPL: CPT

## 2023-05-08 DIAGNOSIS — C61 PROSTATE CANCER (HCC): Primary | ICD-10-CM

## 2023-08-03 ENCOUNTER — HOSPITAL ENCOUNTER (OUTPATIENT)
Dept: RADIATION ONCOLOGY | Age: 68
Setting detail: RECURRING SERIES
Discharge: HOME | End: 2023-08-06
Payer: MEDICARE

## 2023-08-03 DIAGNOSIS — C61 PROSTATE CANCER (HCC): ICD-10-CM

## 2023-08-03 LAB — PSA SERPL-MCNC: 0.9 NG/ML

## 2023-08-03 PROCEDURE — 36415 COLL VENOUS BLD VENIPUNCTURE: CPT

## 2023-08-03 PROCEDURE — 84153 ASSAY OF PSA TOTAL: CPT

## 2023-08-10 ENCOUNTER — APPOINTMENT (OUTPATIENT)
Dept: RADIATION ONCOLOGY | Age: 68
End: 2023-08-10
Payer: MEDICARE

## 2023-08-23 ENCOUNTER — HOSPITAL ENCOUNTER (OUTPATIENT)
Dept: RADIATION ONCOLOGY | Age: 68
Setting detail: RECURRING SERIES
Discharge: HOME | End: 2023-08-26
Payer: MEDICARE

## 2023-08-23 VITALS
TEMPERATURE: 98 F | WEIGHT: 177.9 LBS | RESPIRATION RATE: 16 BRPM | DIASTOLIC BLOOD PRESSURE: 75 MMHG | HEART RATE: 60 BPM | SYSTOLIC BLOOD PRESSURE: 137 MMHG | OXYGEN SATURATION: 96 % | BODY MASS INDEX: 24.13 KG/M2

## 2023-08-23 DIAGNOSIS — C61 PROSTATE CANCER (HCC): Primary | ICD-10-CM

## 2023-08-23 PROCEDURE — 99211 OFF/OP EST MAY X REQ PHY/QHP: CPT

## 2023-08-23 NOTE — PROGRESS NOTES
3 Month Follow Up (Prostate): Lab Review  -PSA complete on: 08/03/2023  -No Urology Appts Scheduled    Patient presents today with no complaints.     AUA: [facility], [facility]

## 2024-01-22 ENCOUNTER — OFFICE VISIT (OUTPATIENT)
Dept: FAMILY MEDICINE CLINIC | Facility: CLINIC | Age: 69
End: 2024-01-22
Payer: MEDICARE

## 2024-01-22 VITALS
OXYGEN SATURATION: 97 % | HEART RATE: 84 BPM | DIASTOLIC BLOOD PRESSURE: 70 MMHG | BODY MASS INDEX: 24.65 KG/M2 | HEIGHT: 72 IN | WEIGHT: 182 LBS | SYSTOLIC BLOOD PRESSURE: 140 MMHG

## 2024-01-22 DIAGNOSIS — R55 NEAR SYNCOPE: Primary | ICD-10-CM

## 2024-01-22 DIAGNOSIS — R00.2 PALPITATIONS: ICD-10-CM

## 2024-01-22 LAB
ALBUMIN SERPL-MCNC: 4 G/DL (ref 3.2–4.6)
ALBUMIN/GLOB SERPL: 1.3 (ref 0.4–1.6)
ALP SERPL-CCNC: 51 U/L (ref 50–136)
ALT SERPL-CCNC: 31 U/L (ref 12–65)
ANION GAP SERPL CALC-SCNC: 3 MMOL/L (ref 2–11)
AST SERPL-CCNC: 22 U/L (ref 15–37)
BILIRUB SERPL-MCNC: 0.4 MG/DL (ref 0.2–1.1)
BUN SERPL-MCNC: 33 MG/DL (ref 8–23)
CALCIUM SERPL-MCNC: 9.2 MG/DL (ref 8.3–10.4)
CHLORIDE SERPL-SCNC: 109 MMOL/L (ref 103–113)
CO2 SERPL-SCNC: 28 MMOL/L (ref 21–32)
CREAT SERPL-MCNC: 1.4 MG/DL (ref 0.8–1.5)
GLOBULIN SER CALC-MCNC: 3.2 G/DL (ref 2.8–4.5)
GLUCOSE SERPL-MCNC: 144 MG/DL (ref 65–100)
GLUCOSE, POC: 155 MG/DL
GRANS ABSOLUTE, POC: 6.9 K/UL
GRANULOCYTES %, POC: 76.3 %
HEMATOCRIT, POC: 44.9 %
HEMOGLOBIN, POC: 14.2 G/DL
LYMPHOCYTE %, POC: 18 %
LYMPHS ABSOLUTE, POC: 1.6 K/UL
MCH, POC: 31.7 PG (ref 20–?)
MCHC, POC: 31.6
MCV, POC: 93.9
MONOCYTE %, POC: 5.7 %
MONOCYTE, ABSOLUTE POC: 0.5 K/UL
MPV, POC: 6.8 FL
PLATELET COUNT, POC: 242 K/UL
POTASSIUM SERPL-SCNC: 4.3 MMOL/L (ref 3.5–5.1)
PROT SERPL-MCNC: 7.2 G/DL (ref 6.3–8.2)
RBC, POC: 4.48 M/UL
RDW, POC: 12.8 %
SODIUM SERPL-SCNC: 140 MMOL/L (ref 136–146)
TSH W FREE THYROID IF ABNORMAL: 2.2 UIU/ML (ref 0.36–3.74)
WBC, POC: 9.1 K/UL

## 2024-01-22 PROCEDURE — 36415 COLL VENOUS BLD VENIPUNCTURE: CPT | Performed by: FAMILY MEDICINE

## 2024-01-22 PROCEDURE — 85025 COMPLETE CBC W/AUTO DIFF WBC: CPT | Performed by: FAMILY MEDICINE

## 2024-01-22 PROCEDURE — 93000 ELECTROCARDIOGRAM COMPLETE: CPT | Performed by: FAMILY MEDICINE

## 2024-01-22 PROCEDURE — 1123F ACP DISCUSS/DSCN MKR DOCD: CPT | Performed by: FAMILY MEDICINE

## 2024-01-22 PROCEDURE — 82962 GLUCOSE BLOOD TEST: CPT | Performed by: FAMILY MEDICINE

## 2024-01-22 PROCEDURE — 99214 OFFICE O/P EST MOD 30 MIN: CPT | Performed by: FAMILY MEDICINE

## 2024-01-22 SDOH — ECONOMIC STABILITY: HOUSING INSECURITY
IN THE LAST 12 MONTHS, WAS THERE A TIME WHEN YOU DID NOT HAVE A STEADY PLACE TO SLEEP OR SLEPT IN A SHELTER (INCLUDING NOW)?: NO

## 2024-01-22 SDOH — ECONOMIC STABILITY: FOOD INSECURITY: WITHIN THE PAST 12 MONTHS, YOU WORRIED THAT YOUR FOOD WOULD RUN OUT BEFORE YOU GOT MONEY TO BUY MORE.: NEVER TRUE

## 2024-01-22 SDOH — ECONOMIC STABILITY: INCOME INSECURITY: HOW HARD IS IT FOR YOU TO PAY FOR THE VERY BASICS LIKE FOOD, HOUSING, MEDICAL CARE, AND HEATING?: NOT HARD AT ALL

## 2024-01-22 SDOH — ECONOMIC STABILITY: FOOD INSECURITY: WITHIN THE PAST 12 MONTHS, THE FOOD YOU BOUGHT JUST DIDN'T LAST AND YOU DIDN'T HAVE MONEY TO GET MORE.: NEVER TRUE

## 2024-01-22 ASSESSMENT — ENCOUNTER SYMPTOMS
SHORTNESS OF BREATH: 0
ABDOMINAL PAIN: 0
RHINORRHEA: 0
COUGH: 0
DIARRHEA: 0
SINUS PAIN: 0

## 2024-01-22 ASSESSMENT — PATIENT HEALTH QUESTIONNAIRE - PHQ9
SUM OF ALL RESPONSES TO PHQ QUESTIONS 1-9: 0
SUM OF ALL RESPONSES TO PHQ QUESTIONS 1-9: 0
SUM OF ALL RESPONSES TO PHQ9 QUESTIONS 1 & 2: 0
1. LITTLE INTEREST OR PLEASURE IN DOING THINGS: 0
SUM OF ALL RESPONSES TO PHQ QUESTIONS 1-9: 0
SUM OF ALL RESPONSES TO PHQ QUESTIONS 1-9: 0
2. FEELING DOWN, DEPRESSED OR HOPELESS: 0

## 2024-01-22 NOTE — PROGRESS NOTES
PROGRESS NOTE    SUBJECTIVE:   Jaya Michael is a 69 y.o. male seen for a follow up visit regarding the following chief complaint:     Chief Complaint   Patient presents with    Other     Gym this morning 10am. Felt weak, lags shaking, light-headed. Rested for a bit and then drove home. First time ever happen. Was well hydrated and feed. Not over heated, no new work out routine.            HPI patient presents to the office today after going to the gym this morning and while exercising started feeling weak legs shaking lightheaded this is about 15 minutes into his workout went back downstairs locker room sat down for a little bit and then finally went home ate soup and crackers and presented to the office.  Past medical history recently/3 years ago for stress test for similar episode      Past Medical History, Past Surgical History, Family history, Social History, and Medications were all reviewed with the patient today and updated as necessary.       Current Outpatient Medications   Medication Sig Dispense Refill    pravastatin (PRAVACHOL) 20 MG tablet Take 1 tablet by mouth daily TAKE 1 TABLET BY MOUTH EVERY NIGHT 90 tablet 3    Cholecalciferol 50 MCG (2000 UT) TABS Take by mouth daily       No current facility-administered medications for this visit.     No Known Allergies  Patient Active Problem List   Diagnosis    Vertigo    Vitamin D deficiency    Joint pain    Hypercholesteremia    Basal cell carcinoma (BCC) of eyelid    Migraine without aura    Microscopic hematuria    Diverticula of colon    Elevated PSA     Past Medical History:   Diagnosis Date    Chronic pain     back pain    Diverticula of colon     Herpes zoster     History of local excision of skin lesion     from face    Hypercholesteremia 11/13/2013    Joint pain     Microscopic hematuria 11/13/2013    Prostate cancer (HCC)     Vertigo     Vitamin D deficiency      Past Surgical History:   Procedure Laterality Date    COLONOSCOPY N/A 2/12/2018

## 2024-01-29 ENCOUNTER — TELEMEDICINE (OUTPATIENT)
Dept: FAMILY MEDICINE CLINIC | Facility: CLINIC | Age: 69
End: 2024-01-29
Payer: MEDICARE

## 2024-01-29 DIAGNOSIS — F41.9 ANXIETY: ICD-10-CM

## 2024-01-29 DIAGNOSIS — R00.2 PALPITATIONS: ICD-10-CM

## 2024-01-29 DIAGNOSIS — R55 NEAR SYNCOPE: Primary | ICD-10-CM

## 2024-01-29 PROBLEM — C61 PROSTATE CANCER (HCC): Status: ACTIVE | Noted: 2024-01-29

## 2024-01-29 PROCEDURE — 99442 PR PHYS/QHP TELEPHONE EVALUATION 11-20 MIN: CPT | Performed by: FAMILY MEDICINE

## 2024-01-29 ASSESSMENT — ENCOUNTER SYMPTOMS
NAUSEA: 0
SHORTNESS OF BREATH: 0
VOMITING: 0

## 2024-01-29 NOTE — PROGRESS NOTES
PROGRESS NOTE    SUBJECTIVE:   Jaya Michael is a 69 y.o. male seen for a follow up visit regarding the following chief complaint:     Chief Complaint   Patient presents with    Follow-up           HPI patient is doing a phone call visit to go over his lab results secondary to a near syncopal episode/palpitations while at the gym past history of this before and was seen by cardiology more than 6 months to a year ago.  Has not had any episodes since last we saw him      Past Medical History, Past Surgical History, Family history, Social History, and Medications were all reviewed with the patient today and updated as necessary.       Current Outpatient Medications   Medication Sig Dispense Refill    pravastatin (PRAVACHOL) 20 MG tablet Take 1 tablet by mouth daily TAKE 1 TABLET BY MOUTH EVERY NIGHT 90 tablet 3    Cholecalciferol 50 MCG (2000 UT) TABS Take by mouth daily       No current facility-administered medications for this visit.     No Known Allergies  Patient Active Problem List   Diagnosis    Vertigo    Vitamin D deficiency    Joint pain    Hypercholesteremia    Basal cell carcinoma (BCC) of eyelid    Migraine without aura    Microscopic hematuria    Diverticula of colon    Elevated PSA    Prostate cancer (HCC)     Past Medical History:   Diagnosis Date    Chronic pain     back pain    Diverticula of colon     Herpes zoster     History of local excision of skin lesion     from face    Hypercholesteremia 11/13/2013    Joint pain     Microscopic hematuria 11/13/2013    Prostate cancer (HCC)     Vertigo     Vitamin D deficiency      Past Surgical History:   Procedure Laterality Date    COLONOSCOPY N/A 2/12/2018    COLONOSCOPY/ 24 performed by Farhan Domingo MD at [facility] ENDOSCOPY    COLONOSCOPY FLX DX W/COLLJ SPEC WHEN PFRMD  02/12/2018    Dr. Domingo-repeat in 10 years.    COLONOSCOPY W/BIOPSY SINGLE/MULTIPLE  6/7/2010    Dr. Solorio-repeat in 7 years.    PROSTATE BIOPSY N/A 11/10/2022    PROSTATE

## 2024-02-02 ENCOUNTER — OFFICE VISIT (OUTPATIENT)
Dept: FAMILY MEDICINE CLINIC | Facility: CLINIC | Age: 69
End: 2024-02-02

## 2024-02-02 VITALS
HEIGHT: 72 IN | BODY MASS INDEX: 24.24 KG/M2 | DIASTOLIC BLOOD PRESSURE: 80 MMHG | HEART RATE: 68 BPM | SYSTOLIC BLOOD PRESSURE: 128 MMHG | WEIGHT: 179 LBS

## 2024-02-02 DIAGNOSIS — F41.9 ANXIETY: ICD-10-CM

## 2024-02-02 DIAGNOSIS — R31.29 MICROSCOPIC HEMATURIA: ICD-10-CM

## 2024-02-02 DIAGNOSIS — R55 NEAR SYNCOPE: Primary | ICD-10-CM

## 2024-02-02 DIAGNOSIS — Z00.00 MEDICARE ANNUAL WELLNESS VISIT, SUBSEQUENT: ICD-10-CM

## 2024-02-02 DIAGNOSIS — R73.9 ELEVATED BLOOD SUGAR: ICD-10-CM

## 2024-02-02 DIAGNOSIS — Z13.31 SCREENING FOR DEPRESSION: ICD-10-CM

## 2024-02-02 DIAGNOSIS — R30.0 DYSURIA: ICD-10-CM

## 2024-02-02 DIAGNOSIS — E55.9 VITAMIN D DEFICIENCY, UNSPECIFIED: ICD-10-CM

## 2024-02-02 DIAGNOSIS — Z00.00 ENCOUNTER FOR GENERAL ADULT MEDICAL EXAMINATION WITHOUT ABNORMAL FINDINGS: ICD-10-CM

## 2024-02-02 DIAGNOSIS — Z00.00 ROUTINE GENERAL MEDICAL EXAMINATION AT A HEALTH CARE FACILITY: ICD-10-CM

## 2024-02-02 DIAGNOSIS — C61 PROSTATE CANCER (HCC): ICD-10-CM

## 2024-02-02 DIAGNOSIS — E78.00 HYPERCHOLESTEREMIA: ICD-10-CM

## 2024-02-02 DIAGNOSIS — R00.2 PALPITATIONS: ICD-10-CM

## 2024-02-02 RX ORDER — PRAVASTATIN SODIUM 20 MG
20 TABLET ORAL DAILY
Qty: 90 TABLET | Refills: 3 | Status: SHIPPED | OUTPATIENT
Start: 2024-02-02

## 2024-02-02 SDOH — ECONOMIC STABILITY: INCOME INSECURITY: HOW HARD IS IT FOR YOU TO PAY FOR THE VERY BASICS LIKE FOOD, HOUSING, MEDICAL CARE, AND HEATING?: NOT HARD AT ALL

## 2024-02-02 SDOH — ECONOMIC STABILITY: TRANSPORTATION INSECURITY
IN THE PAST 12 MONTHS, HAS LACK OF TRANSPORTATION KEPT YOU FROM MEETINGS, WORK, OR FROM GETTING THINGS NEEDED FOR DAILY LIVING?: NO

## 2024-02-02 SDOH — HEALTH STABILITY: PHYSICAL HEALTH: ON AVERAGE, HOW MANY MINUTES DO YOU ENGAGE IN EXERCISE AT THIS LEVEL?: 20 MIN

## 2024-02-02 SDOH — HEALTH STABILITY: PHYSICAL HEALTH: ON AVERAGE, HOW MANY DAYS PER WEEK DO YOU ENGAGE IN MODERATE TO STRENUOUS EXERCISE (LIKE A BRISK WALK)?: 5 DAYS

## 2024-02-02 SDOH — ECONOMIC STABILITY: FOOD INSECURITY: WITHIN THE PAST 12 MONTHS, THE FOOD YOU BOUGHT JUST DIDN'T LAST AND YOU DIDN'T HAVE MONEY TO GET MORE.: NEVER TRUE

## 2024-02-02 SDOH — ECONOMIC STABILITY: FOOD INSECURITY: WITHIN THE PAST 12 MONTHS, YOU WORRIED THAT YOUR FOOD WOULD RUN OUT BEFORE YOU GOT MONEY TO BUY MORE.: NEVER TRUE

## 2024-02-02 ASSESSMENT — LIFESTYLE VARIABLES
HOW OFTEN DO YOU HAVE A DRINK CONTAINING ALCOHOL: NEVER
HOW MANY STANDARD DRINKS CONTAINING ALCOHOL DO YOU HAVE ON A TYPICAL DAY: 0
HOW MANY STANDARD DRINKS CONTAINING ALCOHOL DO YOU HAVE ON A TYPICAL DAY: PATIENT DOES NOT DRINK
HOW OFTEN DO YOU HAVE A DRINK CONTAINING ALCOHOL: 1
HOW OFTEN DO YOU HAVE SIX OR MORE DRINKS ON ONE OCCASION: 1

## 2024-02-02 ASSESSMENT — PATIENT HEALTH QUESTIONNAIRE - PHQ9
SUM OF ALL RESPONSES TO PHQ9 QUESTIONS 1 & 2: 0
SUM OF ALL RESPONSES TO PHQ QUESTIONS 1-9: 0
2. FEELING DOWN, DEPRESSED OR HOPELESS: 0
SUM OF ALL RESPONSES TO PHQ QUESTIONS 1-9: 0
1. LITTLE INTEREST OR PLEASURE IN DOING THINGS: 0
SUM OF ALL RESPONSES TO PHQ QUESTIONS 1-9: 0
SUM OF ALL RESPONSES TO PHQ QUESTIONS 1-9: 0

## 2024-02-02 NOTE — PATIENT INSTRUCTIONS
Eating Healthy Foods: Care Instructions  With every meal, you can make healthy food choices. Try to eat a variety of fruits, vegetables, whole grains, lean proteins, and low-fat dairy products. This can help you get the right balance of nutrients, including vitamins and minerals. Small changes add up over time. You can start by adding one healthy food to your meals each day.    Try to make half your plate fruits and vegetables, one-fourth whole grains, and one-fourth lean proteins. Try including dairy with your meals.   Eat more fruits and vegetables. Try to have them with most meals and snacks.   Foods for healthy eating    Fruits    These can be fresh, frozen, canned, or dried.  Try to choose whole fruit rather than fruit juice.  Eat a variety of colors.    Vegetables    These can be fresh, frozen, canned, or dried.  Beans, peas, and lentils count too.    Whole grains    Choose whole-grain breads, cereals, and noodles.  Try brown rice.    Lean proteins    These can include lean meat, poultry, fish, and eggs.  You can also have tofu, beans, peas, lentils, nuts, and seeds.    Dairy    Try milk, yogurt, and cheese.  Choose low-fat or fat-free when you can.  If you need to, use lactose-free milk or fortified plant-based milk products, such as soy milk.    Water    Drink water when you're thirsty.  Limit sugar-sweetened drinks, including soda, fruit drinks, and sports drinks.  Where can you learn more?  Go to https://www.[employer].net/patientEd and enter T756 to learn more about \"Eating Healthy Foods: Care Instructions.\"  Current as of: September 20, 2023               Content Version: 13.9  © 4728-3673 [employer].   Care instructions adapted under license by [employer]. If you have questions about a medical condition or this instruction, always ask your healthcare professional. [employer] disclaims any warranty or liability for your use of this information.           Advance

## 2024-02-02 NOTE — PROGRESS NOTES
Medicare Annual Wellness Visit    Jaya Michael is here for No chief complaint on file.    Assessment & Plan   Near syncope  Hypercholesteremia  -     pravastatin (PRAVACHOL) 20 MG tablet; Take 1 tablet by mouth daily TAKE 1 TABLET BY MOUTH EVERY NIGHT, Disp-90 tablet, R-3Normal  Prostate cancer (HCC)  Palpitations  Anxiety  Elevated blood sugar  Encounter for general adult medical examination without abnormal findings  Vitamin D deficiency, unspecified  Routine general medical examination at a health care facility  Screening for depression  Medicare annual wellness visit, subsequent  Dysuria  Microscopic hematuria    Recommendations for Preventive Services Due: see orders and patient instructions/AVS.  Recommended screening schedule for the next 5-10 years is provided to the patient in written form: see Patient Instructions/AVS.     No follow-ups on file.     Subjective   Patient presents office today for complete physical without complaints other than he has not heard from cardiology yet    Patient's complete Health Risk Assessment and screening values have been reviewed and are found in Flowsheets. The following problems were reviewed today and where indicated follow up appointments were made and/or referrals ordered.    Positive Risk Factor Screenings with Interventions:                Activity, Diet, and Weight:  On average, how many days per week do you engage in moderate to strenuous exercise (like a brisk walk)?: 5 days  On average, how many minutes do you engage in exercise at this level?: 20 min    Do you eat balanced/healthy meals regularly?: (!) No    Body mass index is 24.28 kg/m².    Do you eat balanced/healthy meals regularly Interventions:  Continue with a well-balanced diet and exercise once cleared by cardiology           Safety:  Do you have any tripping hazards - loose or unsecured carpets or rugs?: (!) Yes  Interventions:  Supportive care given     Advanced Directives:  Do you have a

## 2024-02-06 ENCOUNTER — NURSE ONLY (OUTPATIENT)
Dept: FAMILY MEDICINE CLINIC | Facility: CLINIC | Age: 69
End: 2024-02-06
Payer: MEDICARE

## 2024-02-06 DIAGNOSIS — E55.9 VITAMIN D DEFICIENCY, UNSPECIFIED: ICD-10-CM

## 2024-02-06 DIAGNOSIS — R30.0 DYSURIA: ICD-10-CM

## 2024-02-06 DIAGNOSIS — E78.00 HYPERCHOLESTEREMIA: Primary | ICD-10-CM

## 2024-02-06 DIAGNOSIS — Z00.00 LABORATORY EXAMINATION ORDERED AS PART OF A ROUTINE GENERAL MEDICAL EXAMINATION: ICD-10-CM

## 2024-02-06 LAB
25(OH)D3 SERPL-MCNC: 46.4 NG/ML (ref 30–100)
ALBUMIN SERPL-MCNC: 4.2 G/DL (ref 3.2–4.6)
ALBUMIN/GLOB SERPL: 1.2 (ref 0.4–1.6)
ALP SERPL-CCNC: 53 U/L (ref 50–136)
ALT SERPL-CCNC: 29 U/L (ref 12–65)
ANION GAP SERPL CALC-SCNC: 4 MMOL/L (ref 2–11)
AST SERPL-CCNC: 21 U/L (ref 15–37)
BASOPHILS # BLD: 0 K/UL (ref 0–0.2)
BASOPHILS NFR BLD: 0 % (ref 0–2)
BILIRUB SERPL-MCNC: 0.5 MG/DL (ref 0.2–1.1)
BILIRUBIN, URINE, POC: NEGATIVE
BLOOD URINE, POC: ABNORMAL
BUN SERPL-MCNC: 22 MG/DL (ref 8–23)
CALCIUM SERPL-MCNC: 8.6 MG/DL (ref 8.3–10.4)
CHLORIDE SERPL-SCNC: 109 MMOL/L (ref 103–113)
CHOLEST SERPL-MCNC: 122 MG/DL
CO2 SERPL-SCNC: 30 MMOL/L (ref 21–32)
CREAT SERPL-MCNC: 1.3 MG/DL (ref 0.8–1.5)
DIFFERENTIAL METHOD BLD: ABNORMAL
EOSINOPHIL # BLD: 0.1 K/UL (ref 0–0.8)
EOSINOPHIL NFR BLD: 2 % (ref 0.5–7.8)
ERYTHROCYTE [DISTWIDTH] IN BLOOD BY AUTOMATED COUNT: 13 % (ref 11.9–14.6)
GLOBULIN SER CALC-MCNC: 3.5 G/DL (ref 2.8–4.5)
GLUCOSE SERPL-MCNC: 145 MG/DL (ref 65–100)
GLUCOSE URINE, POC: ABNORMAL
HCT VFR BLD AUTO: 44.5 % (ref 41.1–50.3)
HDLC SERPL-MCNC: 33 MG/DL (ref 40–60)
HDLC SERPL: 3.7
HGB BLD-MCNC: 14.3 G/DL (ref 13.6–17.2)
IMM GRANULOCYTES # BLD AUTO: 0 K/UL (ref 0–0.5)
IMM GRANULOCYTES NFR BLD AUTO: 0 % (ref 0–5)
KETONES, URINE, POC: NEGATIVE
LDLC SERPL CALC-MCNC: 68.4 MG/DL
LEUKOCYTE ESTERASE, URINE, POC: NEGATIVE
LYMPHOCYTES # BLD: 1.4 K/UL (ref 0.5–4.6)
LYMPHOCYTES NFR BLD: 30 % (ref 13–44)
MCH RBC QN AUTO: 31.8 PG (ref 26.1–32.9)
MCHC RBC AUTO-ENTMCNC: 32.1 G/DL (ref 31.4–35)
MCV RBC AUTO: 99.1 FL (ref 82–102)
MONOCYTES # BLD: 0.5 K/UL (ref 0.1–1.3)
MONOCYTES NFR BLD: 11 % (ref 4–12)
NEUTS SEG # BLD: 2.5 K/UL (ref 1.7–8.2)
NEUTS SEG NFR BLD: 57 % (ref 43–78)
NITRITE, URINE, POC: NEGATIVE
NRBC # BLD: 0 K/UL (ref 0–0.2)
PH, URINE, POC: 5.5 (ref 4.6–8)
PLATELET # BLD AUTO: 175 K/UL (ref 150–450)
PMV BLD AUTO: 9.3 FL (ref 9.4–12.3)
POTASSIUM SERPL-SCNC: 3.9 MMOL/L (ref 3.5–5.1)
PROT SERPL-MCNC: 7.7 G/DL (ref 6.3–8.2)
PROTEIN,URINE, POC: ABNORMAL
RBC # BLD AUTO: 4.49 M/UL (ref 4.23–5.6)
SODIUM SERPL-SCNC: 143 MMOL/L (ref 136–146)
SPECIFIC GRAVITY, URINE, POC: 1.03 (ref 1–1.03)
TRIGL SERPL-MCNC: 103 MG/DL (ref 35–150)
URINALYSIS CLARITY, POC: CLEAR
URINALYSIS COLOR, POC: YELLOW
UROBILINOGEN, POC: NORMAL
VLDLC SERPL CALC-MCNC: 20.6 MG/DL (ref 6–23)
WBC # BLD AUTO: 4.5 K/UL (ref 4.3–11.1)

## 2024-02-06 PROCEDURE — 81003 URINALYSIS AUTO W/O SCOPE: CPT | Performed by: FAMILY MEDICINE

## 2024-02-09 ENCOUNTER — OFFICE VISIT (OUTPATIENT)
Dept: FAMILY MEDICINE CLINIC | Facility: CLINIC | Age: 69
End: 2024-02-09

## 2024-02-09 VITALS
HEART RATE: 72 BPM | DIASTOLIC BLOOD PRESSURE: 80 MMHG | HEIGHT: 72 IN | OXYGEN SATURATION: 98 % | WEIGHT: 181 LBS | BODY MASS INDEX: 24.52 KG/M2 | SYSTOLIC BLOOD PRESSURE: 128 MMHG

## 2024-02-09 DIAGNOSIS — J02.9 SORE THROAT: Primary | ICD-10-CM

## 2024-02-09 DIAGNOSIS — U07.1 COVID-19: ICD-10-CM

## 2024-02-09 LAB
EXP DATE SOLUTION: ABNORMAL
EXP DATE SWAB: ABNORMAL
EXPIRATION DATE: ABNORMAL
GROUP A STREP ANTIGEN, POC: NEGATIVE
INFLUENZA A ANTIGEN, POC: NEGATIVE
INFLUENZA B ANTIGEN, POC: NEGATIVE
LOT NUMBER POC: ABNORMAL
LOT NUMBER SOLUTION: ABNORMAL
LOT NUMBER SWAB: ABNORMAL
SARS-COV-2 RNA, POC: POSITIVE
VALID INTERNAL CONTROL, POC: YES
VALID INTERNAL CONTROL, POC: YES

## 2024-02-09 ASSESSMENT — ENCOUNTER SYMPTOMS
WHEEZING: 0
COUGH: 1
RHINORRHEA: 0
SINUS PRESSURE: 1
VOMITING: 0
VOICE CHANGE: 0
SORE THROAT: 1
NAUSEA: 0

## 2024-02-09 NOTE — PROGRESS NOTES
PROGRESS NOTE    SUBJECTIVE:   Jaya Michael is a 69 y.o. male seen for a follow up visit regarding the following chief complaint:         HPI patient presents office with sudden onset fever chills cough congestion      Past Medical History, Past Surgical History, Family history, Social History, and Medications were all reviewed with the patient today and updated as necessary.       Current Outpatient Medications   Medication Sig Dispense Refill    pravastatin (PRAVACHOL) 20 MG tablet Take 1 tablet by mouth daily TAKE 1 TABLET BY MOUTH EVERY NIGHT 90 tablet 3     No current facility-administered medications for this visit.     No Known Allergies  Patient Active Problem List   Diagnosis    Vertigo    Vitamin D deficiency    Joint pain    Hypercholesteremia    Basal cell carcinoma (BCC) of eyelid    Migraine without aura    Microscopic hematuria    Diverticula of colon    Elevated PSA    Prostate cancer (HCC)     Past Medical History:   Diagnosis Date    Chronic pain     back pain    Diverticula of colon     Herpes zoster     History of local excision of skin lesion     from face    Hypercholesteremia 11/13/2013    Joint pain     Microscopic hematuria 11/13/2013    Prostate cancer (HCC)     Vertigo     Vitamin D deficiency      Past Surgical History:   Procedure Laterality Date    COLONOSCOPY N/A 2/12/2018    COLONOSCOPY/ 24 performed by Farhan Domingo MD at [facility] ENDOSCOPY    COLONOSCOPY FLX DX W/COLLJ SPEC WHEN PFRMD  02/12/2018    Dr. Domingo-repeat in 10 years.    COLONOSCOPY W/BIOPSY SINGLE/MULTIPLE  6/7/2010    Dr. Solorio-repeat in 7 years.    PROSTATE BIOPSY N/A 11/10/2022    PROSTATE BIOPSY/MRI FUSION performed by Modesto Benton Jr., MD at [facility] MAIN OR    SKIN LESION EXCISION      face     Family History   Problem Relation Age of Onset    Cancer Maternal Aunt     Diabetes Mother     Hypertension Mother     Asthma Mother     Heart Disease Father      Social History     Tobacco Use    Smoking status: Never

## 2024-02-13 ENCOUNTER — TELEPHONE (OUTPATIENT)
Dept: FAMILY MEDICINE CLINIC | Facility: CLINIC | Age: 69
End: 2024-02-13

## 2024-02-13 NOTE — TELEPHONE ENCOUNTER
Received a call from patient, he stated still has a cough.  Mr. Dunaway tested positive for Covid on 2/09  at our office Per Dr. Go Mr. Dunaway advice to continue taking  mucinex dm bid . Cough might last for a few days or weeks patient denied sob, difficulty breathing or chest pain. Patient advice to call us back if symptoms get worse , or go to ed if having chest pain or difficulty breathing.

## 2024-02-20 ENCOUNTER — TELEMEDICINE (OUTPATIENT)
Dept: FAMILY MEDICINE CLINIC | Facility: CLINIC | Age: 69
End: 2024-02-20
Payer: MEDICARE

## 2024-02-20 DIAGNOSIS — E11.9 TYPE 2 DIABETES MELLITUS WITHOUT COMPLICATION, WITHOUT LONG-TERM CURRENT USE OF INSULIN (HCC): ICD-10-CM

## 2024-02-20 DIAGNOSIS — E78.00 HYPERCHOLESTEREMIA: Primary | ICD-10-CM

## 2024-02-20 DIAGNOSIS — R31.9 HEMATURIA, UNSPECIFIED TYPE: ICD-10-CM

## 2024-02-20 PROCEDURE — 99442 PR PHYS/QHP TELEPHONE EVALUATION 11-20 MIN: CPT | Performed by: FAMILY MEDICINE

## 2024-02-20 NOTE — PROGRESS NOTES
PROGRESS NOTE    SUBJECTIVE:   Jaya Michael is a 69 y.o. male seen for a follow up visit regarding the following chief complaint:     Chief Complaint   Patient presents with    Follow-up           HPI patient is doing a phone call visit to go over his lab results without any new complaintsJaya Michael is a 69 y.o. male evaluated via telephone on 2/20/2024 for Follow-up  .        Total Time: minutes: 11-20 minutes    Jaya Michael was evaluated through a synchronous (real-time) audio encounter. Patient identification was verified at the start of the visit. He (or guardian if applicable) is aware that this is a billable service, which includes applicable co-pays. This visit was conducted with the patient's (and/or legal guardian's) verbal consent. He has not had a related appointment within my department in the past 7 days or scheduled within the next 24 hours.   The patient was located at Home: [address] [place] [address].  The provider was located at Facility (Appt Dept): [address] [place] [address] Dr Thompson [address] [place] [address].    Note: not billable if this call serves to triage the patient into an appointment for the relevant concern         Past Medical History, Past Surgical History, Family history, Social History, and Medications were all reviewed with the patient today and updated as necessary.       Current Outpatient Medications   Medication Sig Dispense Refill    pravastatin (PRAVACHOL) 20 MG tablet Take 1 tablet by mouth daily TAKE 1 TABLET BY MOUTH EVERY NIGHT 90 tablet 3     No current facility-administered medications for this visit.     No Known Allergies  Patient Active Problem List   Diagnosis    Vertigo    Vitamin D deficiency    Joint pain    Hypercholesteremia    Basal cell carcinoma (BCC) of eyelid    Migraine without aura    Microscopic hematuria    Diverticula of colon    Elevated PSA    Prostate cancer (HCC)     Past Medical History:   Diagnosis Date    Chronic

## 2024-02-21 ENCOUNTER — HOSPITAL ENCOUNTER (OUTPATIENT)
Dept: RADIATION ONCOLOGY | Age: 69
Setting detail: RECURRING SERIES
Discharge: HOME | End: 2024-02-24
Payer: MEDICARE

## 2024-02-21 DIAGNOSIS — C61 PROSTATE CANCER (HCC): ICD-10-CM

## 2024-02-21 PROCEDURE — 36415 COLL VENOUS BLD VENIPUNCTURE: CPT

## 2024-02-21 PROCEDURE — 84153 ASSAY OF PSA TOTAL: CPT

## 2024-02-23 LAB — PSA SERPL DL<=0.01 NG/ML-MCNC: 0.21 NG/ML (ref 0–4)

## 2024-02-28 ENCOUNTER — HOSPITAL ENCOUNTER (OUTPATIENT)
Dept: RADIATION ONCOLOGY | Age: 69
Setting detail: RECURRING SERIES
Discharge: HOME | End: 2024-03-02
Payer: MEDICARE

## 2024-02-28 VITALS
OXYGEN SATURATION: 96 % | SYSTOLIC BLOOD PRESSURE: 135 MMHG | TEMPERATURE: 98 F | WEIGHT: 181.3 LBS | HEART RATE: 59 BPM | BODY MASS INDEX: 24.59 KG/M2 | DIASTOLIC BLOOD PRESSURE: 82 MMHG

## 2024-02-28 DIAGNOSIS — C61 PROSTATE CANCER (HCC): Primary | ICD-10-CM

## 2024-02-28 PROCEDURE — 99211 OFF/OP EST MAY X REQ PHY/QHP: CPT

## 2024-02-28 ASSESSMENT — PATIENT HEALTH QUESTIONNAIRE - PHQ9
SUM OF ALL RESPONSES TO PHQ9 QUESTIONS 1 & 2: 0
SUM OF ALL RESPONSES TO PHQ QUESTIONS 1-9: 0
1. LITTLE INTEREST OR PLEASURE IN DOING THINGS: 0
2. FEELING DOWN, DEPRESSED OR HOPELESS: 0

## 2024-02-28 NOTE — PROGRESS NOTES
Patient: Jaya Michael MRN: [id number]  SSN: [id number]    YOB: 1955  Age: 69 y.o.  Sex: male      Other Providers: Dr. Benton     DIAGNOSIS: Favorable intermediate risk carcinoma of the prostate     PREVIOUS TREATMENT:  3/27/23 -5/3/2023: The patient received 70 Gy in 28 fractions to the prostate and seminal vesicles given at 250 cGy per fraction.     HISTORY OF PRESENT ILLNESS:    INTERVAL HISTORY:  Jaya Michael is a 69 y.o. male w/ a history of favorable intermediate risk prostate cancer sp radiation along with 70 Gray in 28 fractions completed in May 2023. He is doing reasonably well with minimal residual XRT effects. His PSA on 2/1/2024 was 0.214 decreased from 0.9 on 8/3/2023, and was 4.9 prior to definitive therapy. He has had some decreased in erections from prior but they were not at 100% prior to treatment. He says that viagra hasn't helped much in the past and he isn't interested in trying again.     AUA =3    Pertinent ROS:   ROS: dysuria no; hematuria no.; leakage no.    GI ROS: regular yes.; pain with bowel movements no.; bleeding no.    Misc ROS: NA; all other review of systems are otherwise negative.      MEDICATIONS:     Current Outpatient Medications:     pravastatin (PRAVACHOL) 20 MG tablet, Take 1 tablet by mouth daily TAKE 1 TABLET BY MOUTH EVERY NIGHT, Disp: 90 tablet, Rfl: 3    ALLERGIES:   No Known Allergies    PHYSICAL EXAMINATION:   ECOG Performance status 0  VITAL SIGNS: There were no vitals filed for this visit.     General: well developed/nourished adult Male in no acute distress; appears stated age  HEENT: normocephalic, atraumatic; EOMI  Extremities: no cyanosis, clubbing, or edema  Musculoskeletal: mobility intact x4; normal ROM in all joints  Neuro: AOx3; sensation intact x 4; CNII-XII grossly intact  Psych: appropriate affect, insight, and judgement    LABORATORY:   Lab Results   Component Value Date/Time     02/06/2024 08:17 AM    K 3.9

## 2024-02-28 NOTE — PROGRESS NOTES
6 Month Follow Up (Prostate): Lab Review  -RT End: 05/03/2023  -PSA, US Complete: 02/21/2024  -Pt followed by Urology (Dr. Benton): 04/05/2024     Patient presents today with no complaints.    AUA: 3     Toya Nguyen MA

## 2024-03-05 ENCOUNTER — INITIAL CONSULT (OUTPATIENT)
Age: 69
End: 2024-03-05
Payer: MEDICARE

## 2024-03-05 VITALS
BODY MASS INDEX: 24.62 KG/M2 | DIASTOLIC BLOOD PRESSURE: 78 MMHG | HEIGHT: 72 IN | HEART RATE: 64 BPM | WEIGHT: 181.8 LBS | SYSTOLIC BLOOD PRESSURE: 124 MMHG

## 2024-03-05 DIAGNOSIS — E78.00 PURE HYPERCHOLESTEROLEMIA: Chronic | ICD-10-CM

## 2024-03-05 DIAGNOSIS — I48.3 TYPICAL ATRIAL FLUTTER (HCC): Primary | ICD-10-CM

## 2024-03-05 DIAGNOSIS — I34.0 NONRHEUMATIC MITRAL VALVE REGURGITATION: Chronic | ICD-10-CM

## 2024-03-05 PROCEDURE — 93000 ELECTROCARDIOGRAM COMPLETE: CPT | Performed by: INTERNAL MEDICINE

## 2024-03-05 PROCEDURE — 1123F ACP DISCUSS/DSCN MKR DOCD: CPT | Performed by: INTERNAL MEDICINE

## 2024-03-05 PROCEDURE — 99204 OFFICE O/P NEW MOD 45 MIN: CPT | Performed by: INTERNAL MEDICINE

## 2024-03-05 RX ORDER — MELATONIN
1000 DAILY
COMMUNITY

## 2024-03-05 ASSESSMENT — ENCOUNTER SYMPTOMS: SHORTNESS OF BREATH: 0

## 2024-03-05 NOTE — PROGRESS NOTES
[facility], [address] [place] [address]  PHONE: [phone]    SUBJECTIVE:   Jaya Michael is a 69 y.o. male 1955   seen for a consultation visit regarding the following:     Chief Complaint   Patient presents with    Consultation    Extremity Weakness              Consultation is requested for evaluation of Consultation and Extremity Weakness   .    History of present illness: 69 y.o. male with PMH HLD presenting for evaluation of palpitations and near-syncope. Patient reports an episode at the gym when he suddenly became weak, light-headed and had leg shaking. This resolved after he ate some food. He has otherwise felt well. He denies palpitations, SAUCEDO or syncope. No other acute complaints.      Past Medical History, Past Surgical History, Family history, Social History, and Medications were all reviewed with the patient today and updated as necessary.       No Known Allergies  Past Medical History:   Diagnosis Date    Chronic pain     back pain    Diverticula of colon     Herpes zoster     History of local excision of skin lesion     from face    Hypercholesteremia 11/13/2013    Joint pain     Microscopic hematuria 11/13/2013    Prostate cancer (HCC)     Vertigo     Vitamin D deficiency      Past Surgical History:   Procedure Laterality Date    COLONOSCOPY N/A 2/12/2018    COLONOSCOPY/ 24 performed by Farhan Domingo MD at [facility] ENDOSCOPY    COLONOSCOPY FLX DX W/COLLJ SPEC WHEN PFRMD  02/12/2018    Dr. Domingo-repeat in 10 years.    COLONOSCOPY W/BIOPSY SINGLE/MULTIPLE  6/7/2010    Dr. Solorio-repeat in 7 years.    PROSTATE BIOPSY N/A 11/10/2022    PROSTATE BIOPSY/MRI FUSION performed by Modesto Benton Jr., MD at [facility] MAIN OR    SKIN LESION EXCISION      face     Family History   Problem Relation Age of Onset    Cancer Maternal Aunt     Diabetes Mother     Hypertension Mother     Asthma Mother     Heart Disease Father      Social History     Tobacco Use    Smoking status: Never

## 2024-03-14 ENCOUNTER — TELEPHONE (OUTPATIENT)
Age: 69
End: 2024-03-14

## 2024-03-19 ENCOUNTER — TELEPHONE (OUTPATIENT)
Age: 69
End: 2024-03-19

## 2024-03-19 DIAGNOSIS — I47.29 NSVT (NONSUSTAINED VENTRICULAR TACHYCARDIA) (HCC): Primary | ICD-10-CM

## 2024-04-04 ASSESSMENT — ENCOUNTER SYMPTOMS: BACK PAIN: 0

## 2024-04-04 NOTE — PROGRESS NOTES
[facility]  [address] [place] [address]  [phone]    Jaya Michael  : 1955    Chief Complaint   Patient presents with    Follow-up     yearly        HPI     Jaya Michael is a 69 y.o. male With elevated PSA. PSA 4.9 in -22, 4.8 in 2-22.    PSA was 3.4 in 11-18, 4.1 in 12-19, 3.1 in 1-20, 3.8 in 12-20.   He denies any significant LUTS. No family hx of prostate cancer.    He stays active at the [facility]. JASON showed 2+ prostate without nodules.   In , total PSA 5.9 with 23.7% free PSA.   MRI 22:   FINDINGS:   Prostate gland size: 4.9 cm transverse x 4.2 cm AP x 4.6 cm craniocaudal.           Peripheral zone:   Lesion 1: There is 0.8 cm lesion with focal homogeneous hypointensity on the ADC   map (series 550 image 9), located on the posterior right peripheral zone at 7   o'clock 1 cm from the midline. There is associated hyperintensity on the high b   value diffusion-weighted sequence. There is associated focal hypointensity on   the T2 sequences. Analysis of the dynamic pre- and postgadolinium kinetics   demonstrates findings positive for focal, early enhancement. PI-RADS   classification: 4 (high probability for the presence of clinically significant   cancer).           Transition zone: Signal changes consistent with benign prostatic hypertrophy are   noted.       Lesion 2: There is a 0.7 cm focal homogeneously T2 hypointense lesion (series 3   image 15), located on the right para midline gland near the apex at 9 o'clock   less than 1 cm from the midline. There is associated focal hypointensity on the   ADC map, and associated hyperintensity on the high b value diffusion-weighted   sequence. Analysis of the dynamic pre- and postgadolinium kinetics demonstrates   findings positive for focal, early enhancement. PI-RADS classification: 4 (high   probability for the presence of clinically significant cancer).           The prostatic capsule

## 2024-04-05 ENCOUNTER — OFFICE VISIT (OUTPATIENT)
Dept: UROLOGY | Age: 69
End: 2024-04-05
Payer: MEDICARE

## 2024-04-05 DIAGNOSIS — C61 PROSTATE CANCER (HCC): ICD-10-CM

## 2024-04-05 DIAGNOSIS — R97.20 ELEVATED PSA: Primary | ICD-10-CM

## 2024-04-05 LAB
BILIRUBIN, URINE, POC: NEGATIVE
BLOOD URINE, POC: NORMAL
GLUCOSE URINE, POC: NEGATIVE
KETONES, URINE, POC: NEGATIVE
LEUKOCYTE ESTERASE, URINE, POC: NEGATIVE
NITRITE, URINE, POC: NEGATIVE
PH, URINE, POC: 6 (ref 4.6–8)
PROTEIN,URINE, POC: NEGATIVE
SPECIFIC GRAVITY, URINE, POC: 1.02 (ref 1–1.03)
URINALYSIS CLARITY, POC: NORMAL
URINALYSIS COLOR, POC: NORMAL
UROBILINOGEN, POC: NORMAL

## 2024-04-05 PROCEDURE — 81003 URINALYSIS AUTO W/O SCOPE: CPT | Performed by: UROLOGY

## 2024-04-05 PROCEDURE — 1123F ACP DISCUSS/DSCN MKR DOCD: CPT | Performed by: UROLOGY

## 2024-04-05 PROCEDURE — 99213 OFFICE O/P EST LOW 20 MIN: CPT | Performed by: UROLOGY

## 2024-04-09 ENCOUNTER — OFFICE VISIT (OUTPATIENT)
Age: 69
End: 2024-04-09
Payer: MEDICARE

## 2024-04-09 VITALS
DIASTOLIC BLOOD PRESSURE: 74 MMHG | WEIGHT: 182 LBS | HEIGHT: 72 IN | SYSTOLIC BLOOD PRESSURE: 138 MMHG | HEART RATE: 64 BPM | BODY MASS INDEX: 24.65 KG/M2

## 2024-04-09 DIAGNOSIS — I48.3 TYPICAL ATRIAL FLUTTER (HCC): Primary | Chronic | ICD-10-CM

## 2024-04-09 DIAGNOSIS — E78.00 PURE HYPERCHOLESTEROLEMIA: Chronic | ICD-10-CM

## 2024-04-09 DIAGNOSIS — I34.0 NONRHEUMATIC MITRAL VALVE REGURGITATION: Chronic | ICD-10-CM

## 2024-04-09 PROCEDURE — 99214 OFFICE O/P EST MOD 30 MIN: CPT | Performed by: INTERNAL MEDICINE

## 2024-04-09 PROCEDURE — 1123F ACP DISCUSS/DSCN MKR DOCD: CPT | Performed by: INTERNAL MEDICINE

## 2024-04-09 ASSESSMENT — ENCOUNTER SYMPTOMS: SHORTNESS OF BREATH: 0

## 2024-04-09 NOTE — PROGRESS NOTES
[facility] CARDIOLOGY  [address] [place] [address], [address]  [place] [address]  PHONE: [phone]      24    NAME:  Jaya Michael  : 1955  MRN: [id number]         SUBJECTIVE:   Jaya Michael is a 69 y.o. male seen for a follow up visit regarding the following:     Chief Complaint   Patient presents with    Results     echo    Atrial Flutter            HPI:  Follow up  Results (echo) and Atrial Flutter   .      69 y.o. male with PMH HLD presenting for follow up evaluation of atrial flutter. He reports feeling well. No palpitations, syncope, dyspnea or chest pain. No episodes like he had at the gym.        Cardiac Medications       HMG CoA Reductase Inhibitors       pravastatin (PRAVACHOL) 20 MG tablet Take 1 tablet by mouth daily TAKE 1 TABLET BY MOUTH EVERY NIGHT                  Past Medical History, Past Surgical History, Family history, Social History, and Medications were all reviewed with the patient today and updated as necessary.     Prior to Admission medications    Medication Sig Start Date End Date Taking? Authorizing Provider   vitamin D (VITAMIN D3) 25 MCG (1000 UT) TABS tablet Take 1 tablet by mouth daily   Yes Provider, MD Tere   pravastatin (PRAVACHOL) 20 MG tablet Take 1 tablet by mouth daily TAKE 1 TABLET BY MOUTH EVERY NIGHT 24  Yes Donald Go,      No Known Allergies  Past Medical History:   Diagnosis Date    Chronic pain     back pain    Diverticula of colon     Elevated PSA     Herpes zoster     History of local excision of skin lesion     from face    Hypercholesteremia 2013    Joint pain     Microscopic hematuria 2013    Prostate cancer (HCC)     Vertigo     Vitamin D deficiency      Past Surgical History:   Procedure Laterality Date    COLONOSCOPY N/A 2018    COLONOSCOPY performed by Farhan Domingo MD at [facility] ENDOSCOPY    COLONOSCOPY FLX DX W/COLLJ SPEC WHEN PFRMD  2018    Dr. Domingo-repeat in 10 years.

## 2024-05-09 ENCOUNTER — PATIENT MESSAGE (OUTPATIENT)
Dept: FAMILY MEDICINE CLINIC | Facility: CLINIC | Age: 69
End: 2024-05-09

## 2024-05-31 ENCOUNTER — NURSE ONLY (OUTPATIENT)
Dept: FAMILY MEDICINE CLINIC | Facility: CLINIC | Age: 69
End: 2024-05-31
Payer: MEDICARE

## 2024-05-31 DIAGNOSIS — R31.9 HEMATURIA, UNSPECIFIED TYPE: ICD-10-CM

## 2024-05-31 DIAGNOSIS — E11.9 TYPE 2 DIABETES MELLITUS WITHOUT COMPLICATION, WITHOUT LONG-TERM CURRENT USE OF INSULIN (HCC): ICD-10-CM

## 2024-05-31 DIAGNOSIS — E78.00 HYPERCHOLESTEREMIA: ICD-10-CM

## 2024-05-31 LAB
BILIRUBIN, URINE, POC: NEGATIVE
BLOOD URINE, POC: ABNORMAL
GLUCOSE URINE, POC: NEGATIVE
KETONES, URINE, POC: NEGATIVE
LEUKOCYTE ESTERASE, URINE, POC: NEGATIVE
MICROALB/CREAT RATIO, POC: ABNORMAL
MICROALBUMIN URINE, POC: 100 MG/L
NITRITE, URINE, POC: NEGATIVE
PH, URINE, POC: 7 (ref 4.6–8)
PROTEIN,URINE, POC: NEGATIVE
SPECIFIC GRAVITY, URINE, POC: 1.02 (ref 1–1.03)
URINALYSIS CLARITY, POC: CLEAR
URINALYSIS COLOR, POC: YELLOW
UROBILINOGEN, POC: NORMAL

## 2024-05-31 PROCEDURE — 81003 URINALYSIS AUTO W/O SCOPE: CPT | Performed by: FAMILY MEDICINE

## 2024-05-31 PROCEDURE — 82043 UR ALBUMIN QUANTITATIVE: CPT | Performed by: FAMILY MEDICINE

## 2024-06-05 ENCOUNTER — TELEPHONE (OUTPATIENT)
Dept: FAMILY MEDICINE CLINIC | Facility: CLINIC | Age: 69
End: 2024-06-05

## 2024-06-05 NOTE — TELEPHONE ENCOUNTER
The reason for canceling follow up appointment was due to lab error. The lab was not able to process his A1c and lipid panel. Per dr. Donavan geller to reschedule a later lab and follow up appointment. Patient was notified of this and verbalized understanding.

## 2024-06-05 NOTE — TELEPHONE ENCOUNTER
Left detail message on patient's voicemail notifying that we have to cancel his appointment today due to lab error. Requesting call back to reschedule lab and follow up appointment.

## 2024-06-10 ENCOUNTER — TELEPHONE (OUTPATIENT)
Dept: FAMILY MEDICINE CLINIC | Facility: CLINIC | Age: 69
End: 2024-06-10

## 2024-06-10 ENCOUNTER — OFFICE VISIT (OUTPATIENT)
Dept: FAMILY MEDICINE CLINIC | Facility: CLINIC | Age: 69
End: 2024-06-10
Payer: MEDICARE

## 2024-06-10 VITALS
BODY MASS INDEX: 24.24 KG/M2 | HEIGHT: 72 IN | DIASTOLIC BLOOD PRESSURE: 76 MMHG | WEIGHT: 179 LBS | HEART RATE: 55 BPM | SYSTOLIC BLOOD PRESSURE: 124 MMHG | TEMPERATURE: 97.7 F

## 2024-06-10 DIAGNOSIS — R31.9 HEMATURIA, UNSPECIFIED TYPE: ICD-10-CM

## 2024-06-10 DIAGNOSIS — N39.0 URINARY TRACT INFECTION WITHOUT HEMATURIA, SITE UNSPECIFIED: Primary | ICD-10-CM

## 2024-06-10 LAB
BILIRUBIN, URINE, POC: NEGATIVE
BLOOD URINE, POC: ABNORMAL
GLUCOSE URINE, POC: ABNORMAL
KETONES, URINE, POC: NEGATIVE
LEUKOCYTE ESTERASE, URINE, POC: NEGATIVE
NITRITE, URINE, POC: NEGATIVE
PH, URINE, POC: 7 (ref 4.6–8)
PROTEIN,URINE, POC: ABNORMAL
SPECIFIC GRAVITY, URINE, POC: 1.01 (ref 1–1.03)
URINALYSIS CLARITY, POC: CLEAR
URINALYSIS COLOR, POC: YELLOW
UROBILINOGEN, POC: NORMAL

## 2024-06-10 PROCEDURE — 81003 URINALYSIS AUTO W/O SCOPE: CPT | Performed by: FAMILY MEDICINE

## 2024-06-10 PROCEDURE — 1123F ACP DISCUSS/DSCN MKR DOCD: CPT | Performed by: FAMILY MEDICINE

## 2024-06-10 PROCEDURE — 99213 OFFICE O/P EST LOW 20 MIN: CPT | Performed by: FAMILY MEDICINE

## 2024-06-10 RX ORDER — CIPROFLOXACIN 500 MG/1
500 TABLET, FILM COATED ORAL 2 TIMES DAILY
Qty: 14 TABLET | Refills: 0 | Status: SHIPPED | OUTPATIENT
Start: 2024-06-10 | End: 2024-06-17

## 2024-06-10 ASSESSMENT — ENCOUNTER SYMPTOMS
COUGH: 0
ABDOMINAL PAIN: 1
SINUS PAIN: 0
DIARRHEA: 0
SHORTNESS OF BREATH: 0
NAUSEA: 1
RHINORRHEA: 0

## 2024-06-10 NOTE — PROGRESS NOTES
PROGRESS NOTE    SUBJECTIVE:   Jaya Michael is a 69 y.o. male seen for a follow up visit regarding the following chief complaint:     Chief Complaint   Patient presents with    Abdominal Pain    Nausea           HPI patient presents office complaining of flank pain abdominal pain as well as pain at the tip of his penis denies any history of kidney stones is leaving to go to the beach in a couple of days      Past Medical History, Past Surgical History, Family history, Social History, and Medications were all reviewed with the patient today and updated as necessary.       Current Outpatient Medications   Medication Sig Dispense Refill    ciprofloxacin (CIPRO) 500 MG tablet Take 1 tablet by mouth 2 times daily for 7 days 14 tablet 0    vitamin D (VITAMIN D3) 25 MCG (1000 UT) TABS tablet Take 1 tablet by mouth daily      pravastatin (PRAVACHOL) 20 MG tablet Take 1 tablet by mouth daily TAKE 1 TABLET BY MOUTH EVERY NIGHT 90 tablet 3     No current facility-administered medications for this visit.     No Known Allergies  Patient Active Problem List   Diagnosis    Vertigo    Vitamin D deficiency    Joint pain    Pure hypercholesterolemia    Basal cell carcinoma (BCC) of eyelid    Migraine without aura    Microscopic hematuria    Diverticula of colon    Elevated PSA    Prostate cancer (HCC)    Nonrheumatic mitral valve regurgitation    Typical atrial flutter (HCC)     Past Medical History:   Diagnosis Date    Chronic pain     back pain    Diverticula of colon     Elevated PSA     Herpes zoster     History of local excision of skin lesion     from face    Hypercholesteremia 11/13/2013    Joint pain     Microscopic hematuria 11/13/2013    Prostate cancer (HCC)     Vertigo     Vitamin D deficiency      Past Surgical History:   Procedure Laterality Date    COLONOSCOPY N/A 2/12/2018    COLONOSCOPY/ 24 performed by Farhan Domingo MD at [facility] ENDOSCOPY    COLONOSCOPY FLX DX W/COLLJ SPEC WHEN PFRMD  02/12/2018

## 2024-06-10 NOTE — TELEPHONE ENCOUNTER
Patient called c/o abdominal and penis pain. Has been urinating regulatory. Denies fever, nausea and vomiting. Painful when he urinates. Started Saturday. Coming in this morning.

## 2024-06-10 NOTE — TELEPHONE ENCOUNTER
Stacey from radiology call stating that the radiologist is almost done interpreting the imaging on this patient. And to notified dr. Go that it showed diverticulitis. Dr. Go notified.

## 2024-06-24 ENCOUNTER — NURSE ONLY (OUTPATIENT)
Dept: FAMILY MEDICINE CLINIC | Facility: CLINIC | Age: 69
End: 2024-06-24
Payer: MEDICARE

## 2024-06-24 DIAGNOSIS — E78.00 HYPERCHOLESTEREMIA: ICD-10-CM

## 2024-06-24 DIAGNOSIS — E11.9 TYPE 2 DIABETES MELLITUS WITHOUT COMPLICATION, WITHOUT LONG-TERM CURRENT USE OF INSULIN (HCC): Primary | ICD-10-CM

## 2024-06-24 DIAGNOSIS — N39.0 URINARY TRACT INFECTION WITHOUT HEMATURIA, SITE UNSPECIFIED: ICD-10-CM

## 2024-06-24 LAB
BILIRUBIN, URINE, POC: NEGATIVE
BLOOD URINE, POC: ABNORMAL
CHOLEST SERPL-MCNC: 134 MG/DL (ref 0–200)
EST. AVERAGE GLUCOSE BLD GHB EST-MCNC: 182 MG/DL
GLUCOSE URINE, POC: NEGATIVE
HBA1C MFR BLD: 8 % (ref 0–5.6)
HDLC SERPL-MCNC: 33 MG/DL (ref 40–60)
HDLC SERPL: 4.1 (ref 0–5)
KETONES, URINE, POC: NEGATIVE
LDLC SERPL CALC-MCNC: 73 MG/DL (ref 0–100)
LEUKOCYTE ESTERASE, URINE, POC: NEGATIVE
NITRITE, URINE, POC: NEGATIVE
PH, URINE, POC: 6 (ref 4.6–8)
PROTEIN,URINE, POC: NEGATIVE
SPECIFIC GRAVITY, URINE, POC: 1.02 (ref 1–1.03)
TRIGL SERPL-MCNC: 143 MG/DL (ref 0–150)
URINALYSIS CLARITY, POC: CLEAR
URINALYSIS COLOR, POC: YELLOW
UROBILINOGEN, POC: NORMAL
VLDLC SERPL CALC-MCNC: 29 MG/DL (ref 6–23)

## 2024-06-24 PROCEDURE — 81003 URINALYSIS AUTO W/O SCOPE: CPT | Performed by: FAMILY MEDICINE

## 2024-07-01 ENCOUNTER — TELEMEDICINE (OUTPATIENT)
Dept: FAMILY MEDICINE CLINIC | Facility: CLINIC | Age: 69
End: 2024-07-01
Payer: MEDICARE

## 2024-07-01 ENCOUNTER — TELEPHONE (OUTPATIENT)
Dept: FAMILY MEDICINE CLINIC | Facility: CLINIC | Age: 69
End: 2024-07-01

## 2024-07-01 DIAGNOSIS — E78.00 HYPERCHOLESTEREMIA: ICD-10-CM

## 2024-07-01 DIAGNOSIS — E11.9 TYPE 2 DIABETES MELLITUS WITHOUT COMPLICATION, WITHOUT LONG-TERM CURRENT USE OF INSULIN (HCC): Primary | ICD-10-CM

## 2024-07-01 PROCEDURE — 99442 PR PHYS/QHP TELEPHONE EVALUATION 11-20 MIN: CPT | Performed by: FAMILY MEDICINE

## 2024-07-01 ASSESSMENT — ENCOUNTER SYMPTOMS
VOMITING: 0
NAUSEA: 0
SHORTNESS OF BREATH: 0

## 2024-07-01 NOTE — PROGRESS NOTES
PROGRESS NOTE    SUBJECTIVE:   Jaya Michael is a 69 y.o. male seen for a follow up visit regarding the following chief complaint:     Chief Complaint   Patient presents with    Follow-up           HPI patient is doing a phone call visit to go over his lab results without any new complaints      Past Medical History, Past Surgical History, Family history, Social History, and Medications were all reviewed with the patient today and updated as necessary.       Current Outpatient Medications   Medication Sig Dispense Refill    vitamin D (VITAMIN D3) 25 MCG (1000 UT) TABS tablet Take 1 tablet by mouth daily      pravastatin (PRAVACHOL) 20 MG tablet Take 1 tablet by mouth daily TAKE 1 TABLET BY MOUTH EVERY NIGHT 90 tablet 3     No current facility-administered medications for this visit.     No Known Allergies  Patient Active Problem List   Diagnosis    Vertigo    Vitamin D deficiency    Joint pain    Pure hypercholesterolemia    Basal cell carcinoma (BCC) of eyelid    Migraine without aura    Microscopic hematuria    Diverticula of colon    Elevated PSA    Prostate cancer (HCC)    Nonrheumatic mitral valve regurgitation    Typical atrial flutter (HCC)     Past Medical History:   Diagnosis Date    Chronic pain     back pain    Diverticula of colon     Elevated PSA     Herpes zoster     History of local excision of skin lesion     from face    Hypercholesteremia 11/13/2013    Joint pain     Microscopic hematuria 11/13/2013    Prostate cancer (HCC)     Vertigo     Vitamin D deficiency      Past Surgical History:   Procedure Laterality Date    COLONOSCOPY N/A 2/12/2018    COLONOSCOPY/ 24 performed by Farhan Domingo MD at [facility] ENDOSCOPY    COLONOSCOPY FLX DX W/COLLJ SPEC WHEN PFRMD  02/12/2018    Dr. Domingo-repeat in 10 years.    COLONOSCOPY W/BIOPSY SINGLE/MULTIPLE  6/7/2010    Dr. Solorio-repeat in 7 years.    PROSTATE BIOPSY N/A 11/10/2022    PROSTATE BIOPSY/MRI FUSION performed by Modesto Benton Jr., MD at [facility]

## 2024-08-28 ENCOUNTER — HOSPITAL ENCOUNTER (OUTPATIENT)
Dept: RADIATION ONCOLOGY | Age: 69
Setting detail: RECURRING SERIES
Discharge: HOME | End: 2024-08-31
Payer: MEDICARE

## 2024-08-28 DIAGNOSIS — C61 PROSTATE CANCER (HCC): ICD-10-CM

## 2024-08-28 LAB — PSA SERPL-MCNC: <0.1 NG/ML (ref 0–4)

## 2024-08-28 PROCEDURE — 84153 ASSAY OF PSA TOTAL: CPT

## 2024-08-28 PROCEDURE — 36415 COLL VENOUS BLD VENIPUNCTURE: CPT

## 2024-09-04 ENCOUNTER — HOSPITAL ENCOUNTER (OUTPATIENT)
Dept: RADIATION ONCOLOGY | Age: 69
Setting detail: RECURRING SERIES
Discharge: HOME | End: 2024-09-07
Payer: MEDICARE

## 2024-09-04 DIAGNOSIS — C61 PROSTATE CANCER (HCC): Primary | ICD-10-CM

## 2024-09-04 PROCEDURE — 99211 OFF/OP EST MAY X REQ PHY/QHP: CPT

## 2024-09-04 RX ORDER — MAGNESIUM 200 MG
200 TABLET ORAL DAILY
COMMUNITY

## 2024-09-04 ASSESSMENT — PATIENT HEALTH QUESTIONNAIRE - PHQ9
SUM OF ALL RESPONSES TO PHQ QUESTIONS 1-9: 0
1. LITTLE INTEREST OR PLEASURE IN DOING THINGS: NOT AT ALL
2. FEELING DOWN, DEPRESSED OR HOPELESS: NOT AT ALL
SUM OF ALL RESPONSES TO PHQ QUESTIONS 1-9: 0
SUM OF ALL RESPONSES TO PHQ9 QUESTIONS 1 & 2: 0

## 2024-09-04 NOTE — PROGRESS NOTES
Patient: Jaya Michael MRN: [id number]  SSN: [id number]    YOB: 1955  Age: 69 y.o.  Sex: male      Other Providers: Dr. Benton     DIAGNOSIS: Favorable intermediate risk carcinoma of the prostate     PREVIOUS TREATMENT:  3/27/23 -5/3/2023: The patient received 70 Gy in 28 fractions to the prostate and seminal vesicles given at 250 cGy per fraction.     HISTORY OF PRESENT ILLNESS:    INTERVAL HISTORY:  Jaya Michael is a 69 y.o. male w/ a history of favorable intermediate risk prostate cancer sp radiation along with 70 Gy in 28 fractions completed in May 2023. He is doing reasonably well with minimal residual XRT effects. His PSA on 2/1/2024 was 0.214 decreased from 0.9 on 8/3/2023, and was 4.9 prior to definitive therapy. Most recently it was <0.1 on 8/28/24.     AUA =3 at last visit; today 6    Pertinent ROS:   ROS: dysuria no; hematuria no.; leakage no.    GI ROS: regular yes.; pain with bowel movements no.; bleeding no.    Misc ROS: NA; all other review of systems are otherwise negative.      MEDICATIONS:     Current Outpatient Medications:     vitamin D (VITAMIN D3) 25 MCG (1000 UT) TABS tablet, Take 1 tablet by mouth daily, Disp: , Rfl:     pravastatin (PRAVACHOL) 20 MG tablet, Take 1 tablet by mouth daily TAKE 1 TABLET BY MOUTH EVERY NIGHT, Disp: 90 tablet, Rfl: 3    ALLERGIES:   No Known Allergies    PHYSICAL EXAMINATION:   ECOG Performance status 0  VITAL SIGNS: There were no vitals filed for this visit.     General: well developed/nourished adult Male in no acute distress; appears stated age  HEENT: normocephalic, atraumatic; EOMI  Extremities: no cyanosis, clubbing, or edema  Musculoskeletal: mobility intact x4; normal ROM in all joints  Neuro: AOx3; sensation intact x 4; CNII-XII grossly intact  Psych: appropriate affect, insight, and judgement    LABORATORY:   Lab Results   Component Value Date/Time     02/06/2024 08:17 AM    K 3.9 02/06/2024 08:17 AM

## 2024-09-04 NOTE — PROGRESS NOTES
Patient here for 6 month Follow up Prostate  PSAd lab complete    Patient is unaccompanied   Patient Vitals as charted  AUA = 6  Patient reports a pain level of  0/10  Patient reports no issues  Orders placed for PSAd in October next year  Alternating with Dr. Benton  Follow up with MD after labs

## 2024-10-09 ENCOUNTER — NURSE ONLY (OUTPATIENT)
Dept: FAMILY MEDICINE CLINIC | Facility: CLINIC | Age: 69
End: 2024-10-09
Payer: MEDICARE

## 2024-10-09 DIAGNOSIS — Z23 NEED FOR VACCINATION: Primary | ICD-10-CM

## 2024-10-09 PROCEDURE — 90653 IIV ADJUVANT VACCINE IM: CPT | Performed by: FAMILY MEDICINE

## 2024-10-09 PROCEDURE — G0008 ADMIN INFLUENZA VIRUS VAC: HCPCS | Performed by: FAMILY MEDICINE

## 2024-11-06 ENCOUNTER — LAB (OUTPATIENT)
Dept: FAMILY MEDICINE CLINIC | Facility: CLINIC | Age: 69
End: 2024-11-06

## 2024-11-06 DIAGNOSIS — E11.9 TYPE 2 DIABETES MELLITUS WITHOUT COMPLICATION, WITHOUT LONG-TERM CURRENT USE OF INSULIN (HCC): ICD-10-CM

## 2024-11-06 DIAGNOSIS — E78.00 HYPERCHOLESTEREMIA: ICD-10-CM

## 2024-11-06 LAB
CHOLEST SERPL-MCNC: 126 MG/DL (ref 0–200)
EST. AVERAGE GLUCOSE BLD GHB EST-MCNC: 188 MG/DL
HBA1C MFR BLD: 8.2 % (ref 0–5.6)
HDLC SERPL-MCNC: 33 MG/DL (ref 40–60)
HDLC SERPL: 3.8 (ref 0–5)
LDLC SERPL CALC-MCNC: 66 MG/DL (ref 0–100)
TRIGL SERPL-MCNC: 134 MG/DL (ref 0–150)
VLDLC SERPL CALC-MCNC: 27 MG/DL (ref 6–23)

## 2024-11-18 ENCOUNTER — TELEMEDICINE (OUTPATIENT)
Dept: FAMILY MEDICINE CLINIC | Facility: CLINIC | Age: 69
End: 2024-11-18

## 2024-11-18 DIAGNOSIS — E78.00 HYPERCHOLESTEREMIA: ICD-10-CM

## 2024-11-18 DIAGNOSIS — E11.9 TYPE 2 DIABETES MELLITUS WITHOUT COMPLICATION, WITHOUT LONG-TERM CURRENT USE OF INSULIN (HCC): Primary | ICD-10-CM

## 2024-11-18 RX ORDER — PRAVASTATIN SODIUM 20 MG
20 TABLET ORAL DAILY
Qty: 90 TABLET | Refills: 3 | Status: SHIPPED | OUTPATIENT
Start: 2024-11-18

## 2024-11-18 RX ORDER — MAGNESIUM 200 MG
250 TABLET ORAL DAILY
COMMUNITY
End: 2024-11-18 | Stop reason: SINTOL

## 2024-11-18 RX ORDER — MULTIVIT-MIN/IRON/FOLIC ACID/K 18-600-40
2000 CAPSULE ORAL DAILY
Qty: 90 CAPSULE | Refills: 3
Start: 2024-11-18

## 2024-11-18 NOTE — PROGRESS NOTES
PROGRESS NOTE    SUBJECTIVE:   Jaya Michael is a 69 y.o. male seen for a follow up visit regarding the following chief complaint:     Chief Complaint   Patient presents with    Follow-up     labs           HPI patient is doing a phone call visit to go over his lab results without any new complaintsJaya Michael is a 69 y.o. male evaluated via telephone on 11/18/2024 for Follow-up (labs)  .        Total Time: Minutes: 10 to 20 minutes    Jyaa Michael was evaluated through a synchronous (real-time) audio encounter. Patient identification was verified at the start of the visit. He (or guardian if applicable) is aware that this is a billable service, which includes applicable co-pays. This visit was conducted with the patient's (and/or legal guardian's) verbal consent. He has not had a related appointment within my department in the past 7 days or scheduled within the next 24 hours.   The patient was located at Home: [address] [place] [address].  The provider was located at Facility (Appt Dept): [address] [place] [address] Dr Thompson [address] [place], [place] [phone].    Note: not billable if this call serves to triage the patient into an appointment for the relevant concern         Past Medical History, Past Surgical History, Family history, Social History, and Medications were all reviewed with the patient today and updated as necessary.       Current Outpatient Medications   Medication Sig Dispense Refill    pravastatin (PRAVACHOL) 20 MG tablet Take 1 tablet by mouth daily TAKE 1 TABLET BY MOUTH EVERY NIGHT 90 tablet 3    Magnesium 250 MG CAPS Take 250 mg by mouth daily 90 capsule 3    vitamin D (VITAMIN D, CHOLECALCIFEROL,) 50 MCG (2000 UT) CAPS capsule Take 1 capsule by mouth daily 90 capsule 3    vitamin D (VITAMIN D3) 25 MCG (1000 UT) TABS tablet Take 1 tablet by mouth daily       No current facility-administered medications for this visit.     No Known Allergies  Patient Active Problem List

## 2025-02-10 ENCOUNTER — OFFICE VISIT (OUTPATIENT)
Dept: FAMILY MEDICINE CLINIC | Facility: CLINIC | Age: 70
End: 2025-02-10
Payer: MEDICARE

## 2025-02-10 VITALS
DIASTOLIC BLOOD PRESSURE: 78 MMHG | HEIGHT: 72 IN | BODY MASS INDEX: 24.11 KG/M2 | WEIGHT: 178 LBS | SYSTOLIC BLOOD PRESSURE: 118 MMHG | HEART RATE: 88 BPM

## 2025-02-10 DIAGNOSIS — Z00.00 MEDICARE ANNUAL WELLNESS VISIT, SUBSEQUENT: ICD-10-CM

## 2025-02-10 DIAGNOSIS — E78.00 HYPERCHOLESTEREMIA: ICD-10-CM

## 2025-02-10 DIAGNOSIS — Z00.00 ROUTINE GENERAL MEDICAL EXAMINATION AT A HEALTH CARE FACILITY: Primary | ICD-10-CM

## 2025-02-10 DIAGNOSIS — Z13.31 SCREENING FOR DEPRESSION: ICD-10-CM

## 2025-02-10 DIAGNOSIS — E55.9 VITAMIN D DEFICIENCY, UNSPECIFIED: ICD-10-CM

## 2025-02-10 DIAGNOSIS — E78.00 PURE HYPERCHOLESTEROLEMIA: ICD-10-CM

## 2025-02-10 DIAGNOSIS — Z12.5 SPECIAL SCREENING FOR MALIGNANT NEOPLASM OF PROSTATE: ICD-10-CM

## 2025-02-10 DIAGNOSIS — E11.9 TYPE 2 DIABETES MELLITUS WITHOUT COMPLICATION, WITHOUT LONG-TERM CURRENT USE OF INSULIN (HCC): ICD-10-CM

## 2025-02-10 DIAGNOSIS — R79.0 LOW MAGNESIUM LEVEL: ICD-10-CM

## 2025-02-10 PROCEDURE — 1123F ACP DISCUSS/DSCN MKR DOCD: CPT | Performed by: FAMILY MEDICINE

## 2025-02-10 PROCEDURE — 1159F MED LIST DOCD IN RCRD: CPT | Performed by: FAMILY MEDICINE

## 2025-02-10 PROCEDURE — G0439 PPPS, SUBSEQ VISIT: HCPCS | Performed by: FAMILY MEDICINE

## 2025-02-10 RX ORDER — MULTIVIT-MIN/IRON/FOLIC ACID/K 18-600-40
2000 CAPSULE ORAL DAILY
Qty: 90 CAPSULE | Refills: 3 | Status: SHIPPED | OUTPATIENT
Start: 2025-02-10

## 2025-02-10 RX ORDER — PRAVASTATIN SODIUM 20 MG
20 TABLET ORAL DAILY
Qty: 90 TABLET | Refills: 3 | Status: SHIPPED | OUTPATIENT
Start: 2025-02-10

## 2025-02-10 SDOH — ECONOMIC STABILITY: FOOD INSECURITY: WITHIN THE PAST 12 MONTHS, YOU WORRIED THAT YOUR FOOD WOULD RUN OUT BEFORE YOU GOT MONEY TO BUY MORE.: NEVER TRUE

## 2025-02-10 SDOH — ECONOMIC STABILITY: FOOD INSECURITY: WITHIN THE PAST 12 MONTHS, THE FOOD YOU BOUGHT JUST DIDN'T LAST AND YOU DIDN'T HAVE MONEY TO GET MORE.: NEVER TRUE

## 2025-02-10 ASSESSMENT — PATIENT HEALTH QUESTIONNAIRE - PHQ9
SUM OF ALL RESPONSES TO PHQ QUESTIONS 1-9: 0
1. LITTLE INTEREST OR PLEASURE IN DOING THINGS: NOT AT ALL
2. FEELING DOWN, DEPRESSED OR HOPELESS: NOT AT ALL
SUM OF ALL RESPONSES TO PHQ9 QUESTIONS 1 & 2: 0

## 2025-02-10 ASSESSMENT — ENCOUNTER SYMPTOMS
COUGH: 0
CONSTIPATION: 0
DIARRHEA: 0
ABDOMINAL PAIN: 0
NAUSEA: 0
WHEEZING: 0
SORE THROAT: 0
SHORTNESS OF BREATH: 0
VOMITING: 0

## 2025-02-10 NOTE — PROGRESS NOTES
PROGRESS NOTE    SUBJECTIVE:   Jaya Michael is a 70 y.o. male seen for a follow up visit regarding the following chief complaint:     Chief Complaint   Patient presents with    Medicare AWV     Medication and pharmacy confirmed.           HPI  Patient presents office today for complete physical/Medicare wellness visit without complaints    Past Medical History, Past Surgical History, Family history, Social History, and Medications were all reviewed with the patient today and updated as necessary.       Current Outpatient Medications   Medication Sig Dispense Refill    Magnesium 250 MG CAPS Take 250 mg by mouth daily 90 capsule 3    pravastatin (PRAVACHOL) 20 MG tablet Take 1 tablet by mouth daily TAKE 1 TABLET BY MOUTH EVERY NIGHT 90 tablet 3    vitamin D (VITAMIN D, CHOLECALCIFEROL,) 50 MCG (2000 UT) CAPS capsule Take 1 capsule by mouth daily 90 capsule 3     No current facility-administered medications for this visit.     No Known Allergies  Patient Active Problem List   Diagnosis    Vertigo    Vitamin D deficiency    Joint pain    Pure hypercholesterolemia    Basal cell carcinoma (BCC) of eyelid    Migraine without aura    Microscopic hematuria    Diverticula of colon    Elevated PSA    Prostate cancer (HCC)    Nonrheumatic mitral valve regurgitation    Typical atrial flutter (HCC)     Past Medical History:   Diagnosis Date    Chronic pain     back pain    Diverticula of colon     Elevated PSA     Herpes zoster     History of local excision of skin lesion     from face    Hypercholesteremia 11/13/2013    Joint pain     Microscopic hematuria 11/13/2013    Prostate cancer (HCC)     Vertigo     Vitamin D deficiency      Past Surgical History:   Procedure Laterality Date    COLONOSCOPY N/A 2/12/2018    COLONOSCOPY/ 24 performed by Farhan Domingo MD at [facility] ENDOSCOPY    COLONOSCOPY FLX DX W/COLLJ SPEC WHEN PFRMD  02/12/2018    Dr. Domingo-repeat in 10 years.    COLONOSCOPY W/BIOPSY SINGLE/MULTIPLE  6/7/2010

## 2025-02-11 ENCOUNTER — LAB (OUTPATIENT)
Dept: FAMILY MEDICINE CLINIC | Facility: CLINIC | Age: 70
End: 2025-02-11
Payer: MEDICARE

## 2025-02-11 DIAGNOSIS — E55.9 VITAMIN D DEFICIENCY, UNSPECIFIED: ICD-10-CM

## 2025-02-11 DIAGNOSIS — Z12.5 SPECIAL SCREENING FOR MALIGNANT NEOPLASM OF PROSTATE: ICD-10-CM

## 2025-02-11 DIAGNOSIS — E11.9 TYPE 2 DIABETES MELLITUS WITHOUT COMPLICATION, WITHOUT LONG-TERM CURRENT USE OF INSULIN (HCC): ICD-10-CM

## 2025-02-11 DIAGNOSIS — E78.00 PURE HYPERCHOLESTEROLEMIA: ICD-10-CM

## 2025-02-11 LAB
25(OH)D3 SERPL-MCNC: 43.2 NG/ML (ref 30–100)
ALBUMIN SERPL-MCNC: 4.4 G/DL (ref 3.2–4.6)
ALBUMIN/GLOB SERPL: 1.5 (ref 1–1.9)
ALP SERPL-CCNC: 51 U/L (ref 40–129)
ALT SERPL-CCNC: 21 U/L (ref 8–55)
ANION GAP SERPL CALC-SCNC: 12 MMOL/L (ref 7–16)
AST SERPL-CCNC: 23 U/L (ref 15–37)
BILIRUB SERPL-MCNC: 1 MG/DL (ref 0–1.2)
BILIRUBIN, URINE, POC: NEGATIVE
BLOOD URINE, POC: NEGATIVE
BUN SERPL-MCNC: 24 MG/DL (ref 8–23)
CALCIUM SERPL-MCNC: 9.3 MG/DL (ref 8.8–10.2)
CHLORIDE SERPL-SCNC: 102 MMOL/L (ref 98–107)
CHOLEST SERPL-MCNC: 124 MG/DL (ref 0–200)
CO2 SERPL-SCNC: 27 MMOL/L (ref 20–29)
CREAT SERPL-MCNC: 1.29 MG/DL (ref 0.8–1.3)
EST. AVERAGE GLUCOSE BLD GHB EST-MCNC: 180 MG/DL
GLOBULIN SER CALC-MCNC: 3 G/DL (ref 2.3–3.5)
GLUCOSE SERPL-MCNC: 137 MG/DL (ref 70–99)
GLUCOSE URINE, POC: NEGATIVE
GRANS ABSOLUTE, POC: 4.3 K/UL
GRANULOCYTES %, POC: 68.7 %
HBA1C MFR BLD: 7.9 % (ref 0–5.6)
HDLC SERPL-MCNC: 33 MG/DL (ref 40–60)
HDLC SERPL: 3.7 (ref 0–5)
HEMATOCRIT, POC: 46.1 %
HEMOGLOBIN, POC: 15 G/DL
KETONES, URINE, POC: NEGATIVE
LDLC SERPL CALC-MCNC: 67 MG/DL (ref 0–100)
LEUKOCYTE ESTERASE, URINE, POC: NEGATIVE
LYMPHOCYTE %, POC: 24.4 %
LYMPHS ABSOLUTE, POC: 1.5 K/UL
MCH, POC: 32.3 PG (ref 20–?)
MCHC, POC: 32.5
MCV, POC: 99.2
MONOCYTE %, POC: 6.9 %
MONOCYTE, ABSOLUTE POC: 0.4 K/UL
MPV, POC: 7.6 FL
NITRITE, URINE, POC: NEGATIVE
PH, URINE, POC: 5.5 (ref 4.6–8)
PLATELET COUNT, POC: 279 K/UL
POTASSIUM SERPL-SCNC: 4.1 MMOL/L (ref 3.5–5.1)
PROT SERPL-MCNC: 7.4 G/DL (ref 6.3–8.2)
PROTEIN,URINE, POC: ABNORMAL
PSA SERPL-MCNC: 0.1 NG/ML (ref 0–4)
RBC, POC: 4.65 M/UL
RDW, POC: 11.9 %
SODIUM SERPL-SCNC: 140 MMOL/L (ref 136–145)
SPECIFIC GRAVITY, URINE, POC: 1.02 (ref 1–1.03)
TRIGL SERPL-MCNC: 122 MG/DL (ref 0–150)
URINALYSIS CLARITY, POC: CLEAR
URINALYSIS COLOR, POC: YELLOW
UROBILINOGEN, POC: NORMAL
VLDLC SERPL CALC-MCNC: 24 MG/DL (ref 6–23)
WBC, POC: 6.2 K/UL

## 2025-02-11 PROCEDURE — 36415 COLL VENOUS BLD VENIPUNCTURE: CPT | Performed by: FAMILY MEDICINE

## 2025-02-11 PROCEDURE — 81003 URINALYSIS AUTO W/O SCOPE: CPT | Performed by: FAMILY MEDICINE

## 2025-02-11 PROCEDURE — 85025 COMPLETE CBC W/AUTO DIFF WBC: CPT | Performed by: FAMILY MEDICINE

## 2025-02-17 ENCOUNTER — TELEMEDICINE (OUTPATIENT)
Dept: FAMILY MEDICINE CLINIC | Facility: CLINIC | Age: 70
End: 2025-02-17
Payer: MEDICARE

## 2025-02-17 DIAGNOSIS — E78.00 HYPERCHOLESTEREMIA: Primary | ICD-10-CM

## 2025-02-17 DIAGNOSIS — E11.9 TYPE 2 DIABETES MELLITUS WITHOUT COMPLICATION, WITHOUT LONG-TERM CURRENT USE OF INSULIN (HCC): ICD-10-CM

## 2025-02-17 PROCEDURE — 99213 OFFICE O/P EST LOW 20 MIN: CPT | Performed by: FAMILY MEDICINE

## 2025-02-17 PROCEDURE — 1159F MED LIST DOCD IN RCRD: CPT | Performed by: FAMILY MEDICINE

## 2025-02-17 ASSESSMENT — ENCOUNTER SYMPTOMS
VOMITING: 0
NAUSEA: 0
SHORTNESS OF BREATH: 0

## 2025-02-17 NOTE — PROGRESS NOTES
PROGRESS NOTE    SUBJECTIVE:   Jaya Michael is a 70 y.o. male seen for a follow up visit regarding the following chief complaint:     Chief Complaint   Patient presents with    Follow-up     labs           HPI patient is doing a phone call visit to go over his lab results without any new complaintsJaya Michael is a 70 y.o. male evaluated via telephone on 2/17/2025 for Follow-up (labs)  .        Total Time: minutes: 11-20 minutes    Jaya Michael was evaluated through a synchronous (real-time) audio encounter. Patient identification was verified at the start of the visit. He (or guardian if applicable) is aware that this is a billable service, which includes applicable co-pays. This visit was conducted with the patient's (and/or legal guardian's) verbal consent. He has not had a related appointment within my department in the past 7 days or scheduled within the next 24 hours.   The patient was located at Home: [address] [place] [address].  The provider was located at Facility (Appt Dept): [address] [place] [address] Dr Thompson [address] [place], [place] [phone].    Note: not billable if this call serves to triage the patient into an appointment for the relevant concern         Past Medical History, Past Surgical History, Family history, Social History, and Medications were all reviewed with the patient today and updated as necessary.       Current Outpatient Medications   Medication Sig Dispense Refill    Magnesium 250 MG CAPS Take 250 mg by mouth daily 90 capsule 3    pravastatin (PRAVACHOL) 20 MG tablet Take 1 tablet by mouth daily TAKE 1 TABLET BY MOUTH EVERY NIGHT 90 tablet 3    vitamin D (VITAMIN D, CHOLECALCIFEROL,) 50 MCG (2000 UT) CAPS capsule Take 1 capsule by mouth daily 90 capsule 3     No current facility-administered medications for this visit.     No Known Allergies  Patient Active Problem List   Diagnosis    Vertigo    Vitamin D deficiency    Joint pain    Pure hypercholesterolemia

## 2025-02-24 ENCOUNTER — TELEPHONE (OUTPATIENT)
Dept: FAMILY MEDICINE CLINIC | Facility: CLINIC | Age: 70
End: 2025-02-24

## 2025-02-24 DIAGNOSIS — Z12.11 SPECIAL SCREENING FOR MALIGNANT NEOPLASMS, COLON: Primary | ICD-10-CM

## 2025-03-31 ENCOUNTER — LAB (OUTPATIENT)
Dept: UROLOGY | Age: 70
End: 2025-03-31

## 2025-03-31 DIAGNOSIS — C61 PROSTATE CANCER (HCC): ICD-10-CM

## 2025-03-31 LAB — PSA SERPL-MCNC: <0.1 NG/ML (ref 0–4)

## 2025-04-07 ENCOUNTER — OFFICE VISIT (OUTPATIENT)
Dept: UROLOGY | Age: 70
End: 2025-04-07
Payer: MEDICARE

## 2025-04-07 VITALS — BODY MASS INDEX: 24.14 KG/M2 | HEIGHT: 72 IN | WEIGHT: 178.2 LBS

## 2025-04-07 DIAGNOSIS — R97.20 ELEVATED PSA: Primary | ICD-10-CM

## 2025-04-07 DIAGNOSIS — N50.812 PAIN IN BOTH TESTICLES: ICD-10-CM

## 2025-04-07 DIAGNOSIS — N50.811 PAIN IN BOTH TESTICLES: ICD-10-CM

## 2025-04-07 DIAGNOSIS — R30.0 DYSURIA: ICD-10-CM

## 2025-04-07 DIAGNOSIS — R31.29 MICROSCOPIC HEMATURIA: ICD-10-CM

## 2025-04-07 DIAGNOSIS — C61 PROSTATE CANCER (HCC): ICD-10-CM

## 2025-04-07 LAB
BILIRUBIN, URINE, POC: NEGATIVE
BLOOD URINE, POC: NORMAL
GLUCOSE URINE, POC: 500
KETONES, URINE, POC: NEGATIVE
LEUKOCYTE ESTERASE, URINE, POC: NEGATIVE
NITRITE, URINE, POC: NEGATIVE
PH, URINE, POC: 5.5 (ref 4.6–8)
PROTEIN,URINE, POC: NEGATIVE
SPECIFIC GRAVITY, URINE, POC: 1 (ref 1–1.03)
URINALYSIS CLARITY, POC: CLEAR
URINALYSIS COLOR, POC: NORMAL
UROBILINOGEN, POC: NORMAL

## 2025-04-07 PROCEDURE — 99213 OFFICE O/P EST LOW 20 MIN: CPT | Performed by: UROLOGY

## 2025-04-07 PROCEDURE — 1125F AMNT PAIN NOTED PAIN PRSNT: CPT | Performed by: UROLOGY

## 2025-04-07 PROCEDURE — 1159F MED LIST DOCD IN RCRD: CPT | Performed by: UROLOGY

## 2025-04-07 PROCEDURE — 81003 URINALYSIS AUTO W/O SCOPE: CPT | Performed by: UROLOGY

## 2025-04-07 PROCEDURE — 1123F ACP DISCUSS/DSCN MKR DOCD: CPT | Performed by: UROLOGY

## 2025-04-07 NOTE — PROGRESS NOTES
R31.29 AMB POC URINALYSIS DIP STICK AUTO W/O MICRO      5. Pain in both testicles  N50.811 Testosterone Total Only, Male    N50.812           Orders Placed This Encounter   Procedures    Testosterone Total Only, Male     Standing Status:   Future     Expected Date:   4/7/2025     Expiration Date:   4/7/2026    PSA, Diagnostic     Standing Status:   Future     Expected Date:   4/7/2026     Expiration Date:   10/7/2026   Testosterone level todayn to work up genital shrinkage and orchialgia.     PSA undetectable.   Follow-up and Dispositions    Return in about 1 year (around 4/7/2026) for appt, PSA before.

## 2025-04-08 LAB — TESTOST SERPL-MCNC: 329 NG/DL (ref 264–916)

## 2025-04-17 ENCOUNTER — OFFICE VISIT (OUTPATIENT)
Dept: UROLOGY | Age: 70
End: 2025-04-17
Payer: MEDICARE

## 2025-04-17 DIAGNOSIS — N50.811 PAIN IN BOTH TESTICLES: Primary | ICD-10-CM

## 2025-04-17 DIAGNOSIS — R31.29 MICROSCOPIC HEMATURIA: ICD-10-CM

## 2025-04-17 DIAGNOSIS — C61 PROSTATE CANCER (HCC): ICD-10-CM

## 2025-04-17 DIAGNOSIS — N50.812 PAIN IN BOTH TESTICLES: Primary | ICD-10-CM

## 2025-04-17 LAB
BILIRUBIN, URINE, POC: NEGATIVE
BLOOD URINE, POC: NORMAL
GLUCOSE URINE, POC: NEGATIVE MG/DL
KETONES, URINE, POC: NEGATIVE MG/DL
LEUKOCYTE ESTERASE, URINE, POC: NEGATIVE
NITRITE, URINE, POC: NEGATIVE
PH, URINE, POC: 5.5 (ref 4.6–8)
PROTEIN,URINE, POC: NEGATIVE MG/DL
SPECIFIC GRAVITY, URINE, POC: 1.03 (ref 1–1.03)
URINALYSIS CLARITY, POC: NORMAL
URINALYSIS COLOR, POC: NORMAL
UROBILINOGEN, POC: NORMAL MG/DL

## 2025-04-17 PROCEDURE — 99214 OFFICE O/P EST MOD 30 MIN: CPT | Performed by: NURSE PRACTITIONER

## 2025-04-17 PROCEDURE — 81003 URINALYSIS AUTO W/O SCOPE: CPT | Performed by: NURSE PRACTITIONER

## 2025-04-17 PROCEDURE — 1123F ACP DISCUSS/DSCN MKR DOCD: CPT | Performed by: NURSE PRACTITIONER

## 2025-04-17 PROCEDURE — 1159F MED LIST DOCD IN RCRD: CPT | Performed by: NURSE PRACTITIONER

## 2025-04-17 PROCEDURE — 1160F RVW MEDS BY RX/DR IN RCRD: CPT | Performed by: NURSE PRACTITIONER

## 2025-04-17 RX ORDER — SULFAMETHOXAZOLE AND TRIMETHOPRIM 800; 160 MG/1; MG/1
1 TABLET ORAL 2 TIMES DAILY
Qty: 20 TABLET | Refills: 0 | Status: SHIPPED | OUTPATIENT
Start: 2025-04-17 | End: 2025-04-27

## 2025-04-17 ASSESSMENT — ENCOUNTER SYMPTOMS: BACK PAIN: 0

## 2025-04-17 NOTE — PROGRESS NOTES
in the Last Year: Never true   Transportation Needs: No Transportation Needs (2/10/2025)    PRAPARE - Transportation     Lack of Transportation (Medical): No     Lack of Transportation (Non-Medical): No   Physical Activity: Sufficiently Active (2/10/2025)    Exercise Vital Sign     Days of Exercise per Week: 5 days     Minutes of Exercise per Session: 30 min   Stress: Not on file   Social Connections: Not on file   Intimate Partner Violence: Not on file   Housing Stability: Low Risk  (2/10/2025)    Housing Stability Vital Sign     Unable to Pay for Housing in the Last Year: No     Number of Times Moved in the Last Year: 0     Homeless in the Last Year: No     Family History   Problem Relation Age of Onset    Cancer Maternal Aunt     Diabetes Mother     Hypertension Mother     Asthma Mother     Heart Disease Father        Review of Systems  Constitutional:   Negative for fever.  Genitourinary:  Negative for hematuria.  Musculoskeletal:  Negative for back pain.      Urinalysis  UA - Dipstick  Results for orders placed or performed in visit on 04/17/25   AMB POC URINALYSIS DIP STICK AUTO W/O MICRO    Collection Time: 04/17/25  1:22 PM   Result Value Ref Range    Color (UA POC)      Clarity (UA POC)      Glucose, Urine, POC Negative Negative mg/dL    Bilirubin, Urine, POC Negative Negative    KETONES, Urine, POC Negative Negative mg/dL    Specific Gravity, Urine, POC 1.030 1.001 - 1.035    Blood (UA POC) Large     pH, Urine, POC 5.5 4.6 - 8.0    Protein, Urine, POC Negative Negative mg/dL    Urobilinogen, POC 0.2 mg/dL <1.1 mg/dL    Nitrite, Urine, POC Negative Negative    Leukocyte Esterase, Urine, POC Negative Negative   Results for orders placed or performed in visit on 04/07/25   AMB POC URINALYSIS DIP STICK AUTO W/O MICRO    Collection Time: 04/07/25  9:57 AM   Result Value Ref Range    Color, Urine, POC Light Yellow     Clarity, Urine, POC Clear     Glucose, Urine,      Bilirubin, Urine, POC Negative

## 2025-04-19 LAB
BACTERIA SPEC CULT: NORMAL
SERVICE CMNT-IMP: NORMAL

## 2025-04-21 ENCOUNTER — RESULTS FOLLOW-UP (OUTPATIENT)
Dept: UROLOGY | Age: 70
End: 2025-04-21

## 2025-05-08 ENCOUNTER — OFFICE VISIT (OUTPATIENT)
Dept: UROLOGY | Age: 70
End: 2025-05-08
Payer: MEDICARE

## 2025-05-08 DIAGNOSIS — N41.0 ACUTE PROSTATITIS: Primary | ICD-10-CM

## 2025-05-08 DIAGNOSIS — C61 PROSTATE CANCER (HCC): ICD-10-CM

## 2025-05-08 DIAGNOSIS — R31.29 MICROSCOPIC HEMATURIA: ICD-10-CM

## 2025-05-08 LAB
BILIRUBIN, URINE, POC: NEGATIVE
BLOOD URINE, POC: NORMAL
GLUCOSE URINE, POC: NEGATIVE MG/DL
KETONES, URINE, POC: NEGATIVE MG/DL
LEUKOCYTE ESTERASE, URINE, POC: NEGATIVE
NITRITE, URINE, POC: NEGATIVE
PH, URINE, POC: 5.5 (ref 4.6–8)
PROTEIN,URINE, POC: NEGATIVE MG/DL
SPECIFIC GRAVITY, URINE, POC: 1.02 (ref 1–1.03)
URINALYSIS CLARITY, POC: NORMAL
URINALYSIS COLOR, POC: NORMAL
UROBILINOGEN, POC: NORMAL MG/DL

## 2025-05-08 PROCEDURE — 1123F ACP DISCUSS/DSCN MKR DOCD: CPT | Performed by: NURSE PRACTITIONER

## 2025-05-08 PROCEDURE — 1160F RVW MEDS BY RX/DR IN RCRD: CPT | Performed by: NURSE PRACTITIONER

## 2025-05-08 PROCEDURE — 99214 OFFICE O/P EST MOD 30 MIN: CPT | Performed by: NURSE PRACTITIONER

## 2025-05-08 PROCEDURE — 81003 URINALYSIS AUTO W/O SCOPE: CPT | Performed by: NURSE PRACTITIONER

## 2025-05-08 PROCEDURE — 1159F MED LIST DOCD IN RCRD: CPT | Performed by: NURSE PRACTITIONER

## 2025-05-08 RX ORDER — SULFAMETHOXAZOLE AND TRIMETHOPRIM 800; 160 MG/1; MG/1
1 TABLET ORAL 2 TIMES DAILY
Qty: 20 TABLET | Refills: 0 | Status: SHIPPED | OUTPATIENT
Start: 2025-05-08 | End: 2025-05-18

## 2025-05-08 ASSESSMENT — ENCOUNTER SYMPTOMS: BACK PAIN: 0

## 2025-05-08 NOTE — PROGRESS NOTES
[facility]  [address] [place] [id number]  [phone]          Jaya Michael  : 1955    Chief Complaint   Patient presents with    Follow-up     Pain in both testicles            HPI    Jaya Michael is a 70 y.o. male with hx of prostate cancer treated with radiation therapy in .     With elevated PSA. PSA 4.9 in -, 4.8 in -.    PSA was 3.4 in -18, 4.1 in -, 3.1 in -, 3.8 in .   He denies any significant LUTS. No family hx of prostate cancer.    He stays active at the [facility]. JASON showed 2+ prostate without nodules.   In , total PSA 5.9 with 23.7% free PSA.   MRI 22:   FINDINGS:   Prostate gland size: 4.9 cm transverse x 4.2 cm AP x 4.6 cm craniocaudal.           Peripheral zone:   Lesion 1: There is 0.8 cm lesion with focal homogeneous hypointensity on the ADC   map (series 550 image 9), located on the posterior right peripheral zone at 7   o'clock 1 cm from the midline. There is associated hyperintensity on the high b   value diffusion-weighted sequence. There is associated focal hypointensity on   the T2 sequences. Analysis of the dynamic pre- and postgadolinium kinetics   demonstrates findings positive for focal, early enhancement. PI-RADS   classification: 4 (high probability for the presence of clinically significant   cancer).           Transition zone: Signal changes consistent with benign prostatic hypertrophy are   noted.       Lesion 2: There is a 0.7 cm focal homogeneously T2 hypointense lesion (series 3   image 15), located on the right para midline gland near the apex at 9 o'clock   less than 1 cm from the midline. There is associated focal hypointensity on the   ADC map, and associated hyperintensity on the high b value diffusion-weighted   sequence. Analysis of the dynamic pre- and postgadolinium kinetics demonstrates   findings positive for focal, early enhancement. PI-RADS classification: 4 (high

## 2025-06-13 ENCOUNTER — OFFICE VISIT (OUTPATIENT)
Dept: UROLOGY | Age: 70
End: 2025-06-13
Payer: MEDICARE

## 2025-06-13 DIAGNOSIS — N41.0 ACUTE PROSTATITIS: Primary | ICD-10-CM

## 2025-06-13 DIAGNOSIS — C61 PROSTATE CANCER (HCC): ICD-10-CM

## 2025-06-13 DIAGNOSIS — R31.29 MICROSCOPIC HEMATURIA: ICD-10-CM

## 2025-06-13 LAB
BILIRUBIN, URINE, POC: NEGATIVE
BLOOD URINE, POC: NORMAL
GLUCOSE URINE, POC: 250 MG/DL
KETONES, URINE, POC: NEGATIVE MG/DL
LEUKOCYTE ESTERASE, URINE, POC: NEGATIVE
NITRITE, URINE, POC: NEGATIVE
PH, URINE, POC: 6 (ref 4.6–8)
PROTEIN,URINE, POC: NEGATIVE MG/DL
SPECIFIC GRAVITY, URINE, POC: 1.02 (ref 1–1.03)
URINALYSIS CLARITY, POC: NORMAL
URINALYSIS COLOR, POC: NORMAL
UROBILINOGEN, POC: NORMAL MG/DL

## 2025-06-13 PROCEDURE — 81003 URINALYSIS AUTO W/O SCOPE: CPT | Performed by: NURSE PRACTITIONER

## 2025-06-13 PROCEDURE — 1123F ACP DISCUSS/DSCN MKR DOCD: CPT | Performed by: NURSE PRACTITIONER

## 2025-06-13 PROCEDURE — 1159F MED LIST DOCD IN RCRD: CPT | Performed by: NURSE PRACTITIONER

## 2025-06-13 PROCEDURE — 99214 OFFICE O/P EST MOD 30 MIN: CPT | Performed by: NURSE PRACTITIONER

## 2025-06-13 PROCEDURE — 1160F RVW MEDS BY RX/DR IN RCRD: CPT | Performed by: NURSE PRACTITIONER

## 2025-06-13 RX ORDER — SULFAMETHOXAZOLE AND TRIMETHOPRIM 800; 160 MG/1; MG/1
1 TABLET ORAL 2 TIMES DAILY
Qty: 28 TABLET | Refills: 1 | Status: SHIPPED | OUTPATIENT
Start: 2025-06-13 | End: 2025-06-27

## 2025-06-13 ASSESSMENT — ENCOUNTER SYMPTOMS: BACK PAIN: 0

## 2025-06-13 NOTE — PROGRESS NOTES
probability for the presence of clinically significant cancer).           The prostatic capsule appears intact. The rectoprostatic angle and neurovascular   bundles are unremarkable. The seminal vesicles are symmetric and unremarkable.   The urinary bladder is unremarkable.       Remainder of the pelvis: No enlarged lymph nodes are identified in the pelvis.   No aggressive appearing lesions in the visualized bony pelvis.                Impression   1. Lesion 1 (PI-RADS 4): A 0.8 cm lesion located at the posterior right   peripheral zone, 7 o'clock, has a PI-RADS classification 4, high probability for   a clinically significant cancer).   2. Lesion 2 (PI-RADS 4): A 0.7 cm lesion located at the right para midline gland   near the apex, 9 o'clock, has a PI-RADS classification 4, high probability for a   clinically significant cancer).   3. There are changes of benign prostatic hypertrophy.      MRI fusion prostate biopsy 11-10-22: PROSTATE VOLUME:  55 gm  PSA 5.9 PSAD 0.11  Path\": DIAGNOSIS        A:  \"PROSTATE, LEFT LATERAL BASE, BIOPSY\":               BENIGN PROSTATE TISSUE.          B:  \"PROSTATE, LEFT LATERAL MID, BIOPSY\":               PROSTATIC ADENOCARCINOMA, DWAYNE SCORE 3 + 3 = 6 (GRADE GROUP   1),                  DISCONTINUOUSLY INVOLVING 30% OF 1 CORE.          C:  \"PROSTATE, LEFT LATERAL APEX, BIOPSY\":               BENIGN PROSTATE TISSUE.          D:  \"PROSTATE, LEFT BASE, BIOPSY\":               BENIGN PROSTATE TISSUE.          E:  \"PROSTATE, LEFT MID, BIOPSY\":               BENIGN PROSTATE TISSUE.          F:  \"PROSTATE, LEFT APEX, BIOPSY\":               BENIGN PROSTATE TISSUE.          G:  \"PROSTATE, RIGHT BASE, BIOPSY\":               BENIGN PROSTATE TISSUE.          H:  \"PROSTATE, RIGHT MID, BIOPSY\":               BENIGN PROSTATE TISSUE.          I:  \"PROSTATE, RIGHT APEX, BIOPSY\":               PROSTATE TISSUE WITH FOCUS OF ATYPICAL GLANDS SUSPICIOUS FOR                  ADENOCARCINOMA.          J:

## 2025-06-20 ENCOUNTER — HOSPITAL ENCOUNTER (EMERGENCY)
Age: 70
Discharge: HOME | End: 2025-06-20
Attending: EMERGENCY MEDICINE
Payer: MEDICARE

## 2025-06-20 VITALS
SYSTOLIC BLOOD PRESSURE: 128 MMHG | DIASTOLIC BLOOD PRESSURE: 81 MMHG | RESPIRATION RATE: 18 BRPM | TEMPERATURE: 97.9 F | HEART RATE: 84 BPM | OXYGEN SATURATION: 99 %

## 2025-06-20 DIAGNOSIS — L30.9 DERMATITIS: Primary | ICD-10-CM

## 2025-06-20 DIAGNOSIS — L50.9 URTICARIA: ICD-10-CM

## 2025-06-20 PROCEDURE — 6370000000 HC RX 637 (ALT 250 FOR IP): Performed by: EMERGENCY MEDICINE

## 2025-06-20 PROCEDURE — 99283 EMERGENCY DEPT VISIT LOW MDM: CPT

## 2025-06-20 RX ORDER — PREDNISONE 20 MG/1
40 TABLET ORAL DAILY
Qty: 8 TABLET | Refills: 0 | Status: SHIPPED | OUTPATIENT
Start: 2025-06-21 | End: 2025-06-25

## 2025-06-20 RX ADMIN — PREDNISONE 60 MG: 50 TABLET ORAL at 05:01

## 2025-06-20 ASSESSMENT — LIFESTYLE VARIABLES
HOW MANY STANDARD DRINKS CONTAINING ALCOHOL DO YOU HAVE ON A TYPICAL DAY: PATIENT DOES NOT DRINK
HOW OFTEN DO YOU HAVE A DRINK CONTAINING ALCOHOL: NEVER

## 2025-06-20 NOTE — ED TRIAGE NOTES
Pt ambulatory to triage stating he has a rash that is spreading all over his body. Report it is itchy and raised in some areas

## 2025-06-20 NOTE — DISCHARGE INSTRUCTIONS
Additional things you may take to help you include over-the-counter Benadryl by mouth for itching/rash, over-the-counter Pepcid once daily, calamine lotion.    Return as needed for any questions concerns or worsening symptoms particularly worsening rash symptoms, development of nausea/vomiting, development of swelling in your legs or face, development of wheezing or difficulty breathing.

## 2025-06-20 NOTE — ED PROVIDER NOTES
Emergency Department Provider Note                   PCP:                Donald Go DO               Age: 70 y.o.      Sex: male   Final diagnosis/impression:  1. Dermatitis    2. Urticaria       Disposition: Discharged    MDM/Clinical Course:  Patient seen by myself at the [facility] emergency department. Patient had signs symptoms and clinical history most consistent with dermatitis/urticarial rash, overall well-appearing.  No other lab/radiology ordered at this time as I did not feel would benefit the patient given history, clinical presentation. While under my care, patient received p.o. prednisone dose.  Outpatient prescription for p.o. prednisone written.  Recommended OTC Benadryl, topical calamine lotion or similar, p.o. Pepcid.  Recommended follow-up as needed with a primary care provider.  I recommended close monitoring of symptoms, low threshold to return as needed.  Patient/family given instructions to return as needed for any questions, concerns or worsening symptoms, particularly those as outlined in the disposition section / discharge section of patient discharge paperwork. Patient/family verbalizes understanding and agreement with ED course/plan in shared medical decision making. Questions answered.    Complexity of Problems Addressed:  1 acute, uncomplicated illness or injury.    Data Reviewed and Analyzed:    Category 1:     Category 2:     Category 3: Discussion of management or test interpretation.  See MDM / clinical course section above for details    Risk of Complications and/or Morbidity of Patient Management:  Over the counter drug management performed.  Prescription drug management performed.  Shared medical decision making was utilized in creating the patients health plan today.    Orders/Meds:    No orders of the defined types were placed in this encounter.     ED Meds Given:  Medications - No data to display  New Prescriptions    No medications on file

## 2025-07-10 ENCOUNTER — OFFICE VISIT (OUTPATIENT)
Dept: UROLOGY | Age: 70
End: 2025-07-10
Payer: MEDICARE

## 2025-07-10 DIAGNOSIS — N41.0 ACUTE PROSTATITIS: Primary | ICD-10-CM

## 2025-07-10 DIAGNOSIS — N50.811 PAIN IN BOTH TESTICLES: ICD-10-CM

## 2025-07-10 DIAGNOSIS — N50.812 PAIN IN BOTH TESTICLES: ICD-10-CM

## 2025-07-10 DIAGNOSIS — C61 PROSTATE CANCER (HCC): ICD-10-CM

## 2025-07-10 LAB
BILIRUBIN, URINE, POC: NEGATIVE
BLOOD URINE, POC: NORMAL
GLUCOSE URINE, POC: 250 MG/DL
KETONES, URINE, POC: NEGATIVE MG/DL
LEUKOCYTE ESTERASE, URINE, POC: NEGATIVE
NITRITE, URINE, POC: NEGATIVE
PH, URINE, POC: 5.5 (ref 4.6–8)
PROTEIN,URINE, POC: NEGATIVE MG/DL
SPECIFIC GRAVITY, URINE, POC: 1.02 (ref 1–1.03)
URINALYSIS CLARITY, POC: NORMAL
URINALYSIS COLOR, POC: NORMAL
UROBILINOGEN, POC: NORMAL MG/DL

## 2025-07-10 PROCEDURE — 1160F RVW MEDS BY RX/DR IN RCRD: CPT | Performed by: NURSE PRACTITIONER

## 2025-07-10 PROCEDURE — 81003 URINALYSIS AUTO W/O SCOPE: CPT | Performed by: NURSE PRACTITIONER

## 2025-07-10 PROCEDURE — 1123F ACP DISCUSS/DSCN MKR DOCD: CPT | Performed by: NURSE PRACTITIONER

## 2025-07-10 PROCEDURE — 99213 OFFICE O/P EST LOW 20 MIN: CPT | Performed by: NURSE PRACTITIONER

## 2025-07-10 PROCEDURE — 1159F MED LIST DOCD IN RCRD: CPT | Performed by: NURSE PRACTITIONER

## 2025-07-10 ASSESSMENT — ENCOUNTER SYMPTOMS: BACK PAIN: 0

## 2025-07-10 NOTE — PROGRESS NOTES
[facility]  [address] [place] [id number]  [phone]          Jaya Michael  : 1955    Chief Complaint   Patient presents with    Follow-up     Acute prostatitis          HPI     Jaya Michael is a 70 y.o. male with hx of prostate cancer treated with radiation therapy in .     With elevated PSA. PSA 4.9 in -, 4.8 in -.    PSA was 3.4 in -, 4.1 in -, 3.1 in -, 3.8 in .   He denies any significant LUTS. No family hx of prostate cancer.    He stays active at the [facility]. JASON showed 2+ prostate without nodules.   In , total PSA 5.9 with 23.7% free PSA.   MRI 22:   FINDINGS:   Prostate gland size: 4.9 cm transverse x 4.2 cm AP x 4.6 cm craniocaudal.           Peripheral zone:   Lesion 1: There is 0.8 cm lesion with focal homogeneous hypointensity on the ADC   map (series 550 image 9), located on the posterior right peripheral zone at 7   o'clock 1 cm from the midline. There is associated hyperintensity on the high b   value diffusion-weighted sequence. There is associated focal hypointensity on   the T2 sequences. Analysis of the dynamic pre- and postgadolinium kinetics   demonstrates findings positive for focal, early enhancement. PI-RADS   classification: 4 (high probability for the presence of clinically significant   cancer).           Transition zone: Signal changes consistent with benign prostatic hypertrophy are   noted.       Lesion 2: There is a 0.7 cm focal homogeneously T2 hypointense lesion (series 3   image 15), located on the right para midline gland near the apex at 9 o'clock   less than 1 cm from the midline. There is associated focal hypointensity on the   ADC map, and associated hyperintensity on the high b value diffusion-weighted   sequence. Analysis of the dynamic pre- and postgadolinium kinetics demonstrates   findings positive for focal, early enhancement. PI-RADS classification: 4 (high

## 2025-07-24 ENCOUNTER — HOSPITAL ENCOUNTER (OUTPATIENT)
Dept: ULTRASOUND IMAGING | Age: 70
Discharge: HOME | End: 2025-07-26
Payer: MEDICARE

## 2025-07-24 DIAGNOSIS — N50.811 PAIN IN BOTH TESTICLES: ICD-10-CM

## 2025-07-24 DIAGNOSIS — N50.812 PAIN IN BOTH TESTICLES: ICD-10-CM

## 2025-07-24 PROCEDURE — 76870 US EXAM SCROTUM: CPT

## 2025-08-14 ENCOUNTER — OFFICE VISIT (OUTPATIENT)
Age: 70
End: 2025-08-14
Payer: MEDICARE

## 2025-08-14 ENCOUNTER — TELEPHONE (OUTPATIENT)
Age: 70
End: 2025-08-14

## 2025-08-14 VITALS
BODY MASS INDEX: 23.57 KG/M2 | WEIGHT: 174 LBS | SYSTOLIC BLOOD PRESSURE: 128 MMHG | HEIGHT: 72 IN | HEART RATE: 87 BPM | DIASTOLIC BLOOD PRESSURE: 86 MMHG

## 2025-08-14 DIAGNOSIS — I34.0 NONRHEUMATIC MITRAL VALVE REGURGITATION: ICD-10-CM

## 2025-08-14 DIAGNOSIS — E11.9 TYPE 2 DIABETES MELLITUS WITHOUT COMPLICATION, WITHOUT LONG-TERM CURRENT USE OF INSULIN (HCC): ICD-10-CM

## 2025-08-14 DIAGNOSIS — I48.3 TYPICAL ATRIAL FLUTTER (HCC): ICD-10-CM

## 2025-08-14 DIAGNOSIS — I48.3 TYPICAL ATRIAL FLUTTER (HCC): Primary | ICD-10-CM

## 2025-08-14 DIAGNOSIS — E78.00 PURE HYPERCHOLESTEROLEMIA: ICD-10-CM

## 2025-08-14 DIAGNOSIS — R06.02 SHORTNESS OF BREATH: ICD-10-CM

## 2025-08-14 LAB
ANION GAP SERPL CALC-SCNC: 13 MMOL/L (ref 7–16)
BUN SERPL-MCNC: 28 MG/DL (ref 8–23)
CALCIUM SERPL-MCNC: 9.8 MG/DL (ref 8.8–10.2)
CHLORIDE SERPL-SCNC: 102 MMOL/L (ref 98–107)
CO2 SERPL-SCNC: 26 MMOL/L (ref 20–29)
CREAT SERPL-MCNC: 1.27 MG/DL (ref 0.8–1.3)
ERYTHROCYTE [DISTWIDTH] IN BLOOD BY AUTOMATED COUNT: 12.8 % (ref 11.9–14.6)
GLUCOSE SERPL-MCNC: 189 MG/DL (ref 70–99)
HCT VFR BLD AUTO: 45 % (ref 41.1–50.3)
HGB BLD-MCNC: 15.2 G/DL (ref 13.6–17.2)
MCH RBC QN AUTO: 33.2 PG (ref 26.1–32.9)
MCHC RBC AUTO-ENTMCNC: 33.8 G/DL (ref 31.4–35)
MCV RBC AUTO: 98.3 FL (ref 82–102)
NRBC # BLD: 0 K/UL (ref 0–0.2)
PLATELET # BLD AUTO: 235 K/UL (ref 150–450)
PMV BLD AUTO: 9.2 FL (ref 9.4–12.3)
POTASSIUM SERPL-SCNC: 4.4 MMOL/L (ref 3.5–5.1)
RBC # BLD AUTO: 4.58 M/UL (ref 4.23–5.6)
SODIUM SERPL-SCNC: 141 MMOL/L (ref 136–145)
TSH W FREE THYROID IF ABNORMAL: 1.93 UIU/ML (ref 0.27–4.2)
WBC # BLD AUTO: 6.6 K/UL (ref 4.3–11.1)

## 2025-08-14 PROCEDURE — 99214 OFFICE O/P EST MOD 30 MIN: CPT | Performed by: INTERNAL MEDICINE

## 2025-08-14 PROCEDURE — 1123F ACP DISCUSS/DSCN MKR DOCD: CPT | Performed by: INTERNAL MEDICINE

## 2025-08-14 PROCEDURE — 1126F AMNT PAIN NOTED NONE PRSNT: CPT | Performed by: INTERNAL MEDICINE

## 2025-08-14 PROCEDURE — 93000 ELECTROCARDIOGRAM COMPLETE: CPT | Performed by: INTERNAL MEDICINE

## 2025-08-14 PROCEDURE — 3051F HG A1C>EQUAL 7.0%<8.0%: CPT | Performed by: INTERNAL MEDICINE

## 2025-08-14 RX ORDER — METOPROLOL SUCCINATE 25 MG/1
25 TABLET, EXTENDED RELEASE ORAL DAILY
Qty: 90 TABLET | Refills: 1 | Status: SHIPPED | OUTPATIENT
Start: 2025-08-14

## 2025-08-15 ENCOUNTER — TELEPHONE (OUTPATIENT)
Age: 70
End: 2025-08-15

## 2025-08-21 ENCOUNTER — LAB (OUTPATIENT)
Dept: FAMILY MEDICINE CLINIC | Facility: CLINIC | Age: 70
End: 2025-08-21

## 2025-08-21 DIAGNOSIS — E78.00 HYPERCHOLESTEREMIA: ICD-10-CM

## 2025-08-21 DIAGNOSIS — E11.9 TYPE 2 DIABETES MELLITUS WITHOUT COMPLICATION, WITHOUT LONG-TERM CURRENT USE OF INSULIN (HCC): ICD-10-CM

## 2025-08-21 LAB
CHOLEST SERPL-MCNC: 134 MG/DL (ref 0–200)
EST. AVERAGE GLUCOSE BLD GHB EST-MCNC: 175 MG/DL
HBA1C MFR BLD: 7.7 % (ref 0–5.6)
HDLC SERPL-MCNC: 38 MG/DL (ref 40–60)
HDLC SERPL: 3.5 (ref 0–5)
LDLC SERPL CALC-MCNC: 74 MG/DL (ref 0–100)
TRIGL SERPL-MCNC: 108 MG/DL (ref 0–150)
VLDLC SERPL CALC-MCNC: 22 MG/DL (ref 6–23)

## 2025-08-25 ENCOUNTER — TELEPHONE (OUTPATIENT)
Dept: FAMILY MEDICINE CLINIC | Facility: CLINIC | Age: 70
End: 2025-08-25

## 2025-08-25 ENCOUNTER — OFFICE VISIT (OUTPATIENT)
Dept: FAMILY MEDICINE CLINIC | Facility: CLINIC | Age: 70
End: 2025-08-25
Payer: MEDICARE

## 2025-08-25 VITALS
DIASTOLIC BLOOD PRESSURE: 80 MMHG | WEIGHT: 168 LBS | HEIGHT: 72 IN | BODY MASS INDEX: 22.75 KG/M2 | HEART RATE: 63 BPM | SYSTOLIC BLOOD PRESSURE: 122 MMHG

## 2025-08-25 DIAGNOSIS — R30.9 PAINFUL URINATION: ICD-10-CM

## 2025-08-25 DIAGNOSIS — R31.0 HEMATURIA, GROSS: Primary | ICD-10-CM

## 2025-08-25 LAB
BILIRUBIN, URINE, POC: NEGATIVE
BLOOD URINE, POC: ABNORMAL
GLUCOSE URINE, POC: ABNORMAL
KETONES, URINE, POC: NEGATIVE
LEUKOCYTE ESTERASE, URINE, POC: NEGATIVE
NITRITE, URINE, POC: NEGATIVE
PH, URINE, POC: 5.5 (ref 4.6–8)
PROTEIN,URINE, POC: ABNORMAL
SPECIFIC GRAVITY, URINE, POC: 1.02 (ref 1–1.03)
URINALYSIS CLARITY, POC: ABNORMAL
URINALYSIS COLOR, POC: YELLOW
UROBILINOGEN, POC: NORMAL

## 2025-08-25 PROCEDURE — 99213 OFFICE O/P EST LOW 20 MIN: CPT | Performed by: FAMILY MEDICINE

## 2025-08-25 PROCEDURE — 81003 URINALYSIS AUTO W/O SCOPE: CPT | Performed by: FAMILY MEDICINE

## 2025-08-25 PROCEDURE — 1159F MED LIST DOCD IN RCRD: CPT | Performed by: FAMILY MEDICINE

## 2025-08-25 PROCEDURE — 1123F ACP DISCUSS/DSCN MKR DOCD: CPT | Performed by: FAMILY MEDICINE

## 2025-08-25 RX ORDER — PHENAZOPYRIDINE HYDROCHLORIDE 100 MG/1
100 TABLET, FILM COATED ORAL 3 TIMES DAILY PRN
Qty: 21 TABLET | Refills: 0 | Status: SHIPPED | OUTPATIENT
Start: 2025-08-25 | End: 2026-08-25

## 2025-08-25 ASSESSMENT — ENCOUNTER SYMPTOMS
VOMITING: 0
SHORTNESS OF BREATH: 0
NAUSEA: 0

## 2025-08-26 ENCOUNTER — OFFICE VISIT (OUTPATIENT)
Dept: UROLOGY | Age: 70
End: 2025-08-26
Payer: MEDICARE

## 2025-08-26 DIAGNOSIS — C61 PROSTATE CANCER (HCC): ICD-10-CM

## 2025-08-26 DIAGNOSIS — R31.0 GROSS HEMATURIA: ICD-10-CM

## 2025-08-26 DIAGNOSIS — R39.9 LOWER URINARY TRACT SYMPTOMS (LUTS): Primary | ICD-10-CM

## 2025-08-26 LAB
BILIRUBIN, URINE, POC: NEGATIVE
BLOOD URINE, POC: NORMAL
GLUCOSE URINE, POC: 100 MG/DL
KETONES, URINE, POC: NEGATIVE MG/DL
LEUKOCYTE ESTERASE, URINE, POC: NEGATIVE
NITRITE, URINE, POC: NEGATIVE
PH, URINE, POC: 6 (ref 4.6–8)
PROTEIN,URINE, POC: NEGATIVE MG/DL
SPECIFIC GRAVITY, URINE, POC: 1.02 (ref 1–1.03)
URINALYSIS CLARITY, POC: NORMAL
URINALYSIS COLOR, POC: NORMAL
UROBILINOGEN, POC: NORMAL MG/DL

## 2025-08-26 PROCEDURE — 1123F ACP DISCUSS/DSCN MKR DOCD: CPT | Performed by: NURSE PRACTITIONER

## 2025-08-26 PROCEDURE — 81003 URINALYSIS AUTO W/O SCOPE: CPT | Performed by: NURSE PRACTITIONER

## 2025-08-26 PROCEDURE — 1159F MED LIST DOCD IN RCRD: CPT | Performed by: NURSE PRACTITIONER

## 2025-08-26 PROCEDURE — 99214 OFFICE O/P EST MOD 30 MIN: CPT | Performed by: NURSE PRACTITIONER

## 2025-08-26 PROCEDURE — 1160F RVW MEDS BY RX/DR IN RCRD: CPT | Performed by: NURSE PRACTITIONER

## 2025-08-26 ASSESSMENT — ENCOUNTER SYMPTOMS: BACK PAIN: 0

## 2025-08-27 LAB
BACTERIA SPEC CULT: NORMAL
SERVICE CMNT-IMP: NORMAL

## 2025-08-29 ENCOUNTER — TELEPHONE (OUTPATIENT)
Dept: CARDIOLOGY CLINIC | Age: 70
End: 2025-08-29

## 2025-08-29 DIAGNOSIS — I48.3 TYPICAL ATRIAL FLUTTER (HCC): Primary | ICD-10-CM

## 2025-09-05 ENCOUNTER — HOSPITAL ENCOUNTER (OUTPATIENT)
Dept: CT IMAGING | Age: 70
Discharge: HOME | End: 2025-09-05
Payer: MEDICARE

## 2025-09-05 DIAGNOSIS — R31.0 GROSS HEMATURIA: ICD-10-CM

## 2025-09-05 DIAGNOSIS — R39.9 LOWER URINARY TRACT SYMPTOMS (LUTS): ICD-10-CM

## 2025-09-05 DIAGNOSIS — C61 PROSTATE CANCER (HCC): ICD-10-CM

## 2025-09-05 PROCEDURE — 74178 CT ABD&PLV WO CNTR FLWD CNTR: CPT

## 2025-09-05 PROCEDURE — 6360000004 HC RX CONTRAST MEDICATION: Performed by: NURSE PRACTITIONER

## 2025-09-05 RX ORDER — IOPAMIDOL 755 MG/ML
100 INJECTION, SOLUTION INTRAVASCULAR
Status: COMPLETED | OUTPATIENT
Start: 2025-09-05 | End: 2025-09-05

## 2025-09-05 RX ADMIN — IOPAMIDOL 100 ML: 755 INJECTION, SOLUTION INTRAVENOUS at 16:00

## (undated) DEVICE — COVER PRB W1XL11.8IN ENDOCAVITY CLR E BND NEOGUARD

## (undated) DEVICE — HOLDER PRB URONAV

## (undated) DEVICE — GLOVE SURG SZ 8 CRM LTX FREE POLYISOPRENE POLYMER BEAD ANTI

## (undated) DEVICE — KIT BX PROST 20ML VI PREFIL W 10ML 10% NEUT BUFF FRMLN LEAK

## (undated) DEVICE — JELLY,LUBE,STERILE,FLIP TOP,TUBE,4-OZ: Brand: MEDLINE

## (undated) DEVICE — DRAPE TWL SURG 16X26IN BLU ORB04] ALLCARE INC]

## (undated) DEVICE — STERILE PACKED. BORE DIAMETER 1.6 MM; ANGLE OF INSERTION 19° TO THE LONG AXIS OF THE TRANSDUCER: Brand: SINGLE-USE BIPLANE BIOPSY GUIDE

## (undated) DEVICE — MAX-CORE® DISPOSABLE CORE BIOPSY INSTRUMENT, 18G X 25CM: Brand: MAX-CORE